# Patient Record
Sex: FEMALE | Race: WHITE | Employment: OTHER | URBAN - METROPOLITAN AREA
[De-identification: names, ages, dates, MRNs, and addresses within clinical notes are randomized per-mention and may not be internally consistent; named-entity substitution may affect disease eponyms.]

---

## 2017-04-26 ENCOUNTER — HOSPITAL ENCOUNTER (OUTPATIENT)
Age: 77
Setting detail: SPECIMEN
Discharge: HOME | End: 2017-04-26
Payer: MEDICARE

## 2017-05-01 LAB — SURGICAL PATHOLOGY REPORT: NORMAL

## 2022-05-16 RX ORDER — CYANOCOBALAMIN (VITAMIN B-12) 1000 MCG
2500 TABLET, EXTENDED RELEASE ORAL EVERY OTHER DAY
COMMUNITY

## 2022-05-16 RX ORDER — VIT C/B6/B5/MAGNESIUM/HERB 173 50-5-6-5MG
1000 CAPSULE ORAL DAILY
COMMUNITY

## 2022-05-16 RX ORDER — UBIDECARENONE 50 MG
2400 CAPSULE ORAL DAILY
COMMUNITY

## 2022-05-16 RX ORDER — HYDROXYCHLOROQUINE SULFATE 200 MG/1
200 TABLET, FILM COATED ORAL NIGHTLY
COMMUNITY

## 2022-05-16 RX ORDER — ACETAMINOPHEN 160 MG
2000 TABLET,DISINTEGRATING ORAL DAILY
COMMUNITY

## 2022-05-16 RX ORDER — M-VIT,TX,IRON,MINS/CALC/FOLIC 27MG-0.4MG
1 TABLET ORAL DAILY
COMMUNITY

## 2022-05-16 RX ORDER — CALCIUM CARBONATE 500(1250)
1000 TABLET ORAL DAILY
COMMUNITY

## 2022-05-16 RX ORDER — DIMENHYDRINATE 50 MG
100 TABLET ORAL DAILY
COMMUNITY

## 2022-05-18 ENCOUNTER — HOSPITAL ENCOUNTER (OUTPATIENT)
Age: 82
Setting detail: OUTPATIENT SURGERY
Discharge: HOME | End: 2022-05-18
Attending: OPHTHALMOLOGY | Admitting: OPHTHALMOLOGY
Payer: MEDICARE

## 2022-05-18 ENCOUNTER — ANESTHESIA (OUTPATIENT)
Dept: OPERATING ROOM | Age: 82
End: 2022-05-18
Payer: MEDICARE

## 2022-05-18 ENCOUNTER — ANESTHESIA EVENT (OUTPATIENT)
Dept: OPERATING ROOM | Age: 82
End: 2022-05-18
Payer: MEDICARE

## 2022-05-18 VITALS
HEIGHT: 63 IN | WEIGHT: 148 LBS | RESPIRATION RATE: 18 BRPM | DIASTOLIC BLOOD PRESSURE: 78 MMHG | SYSTOLIC BLOOD PRESSURE: 111 MMHG | OXYGEN SATURATION: 94 % | BODY MASS INDEX: 26.22 KG/M2 | TEMPERATURE: 97.9 F | HEART RATE: 60 BPM

## 2022-05-18 PROCEDURE — 3700000000 HC ANESTHESIA ATTENDED CARE: Performed by: OPHTHALMOLOGY

## 2022-05-18 PROCEDURE — 7100000040 HC SPAR PHASE II RECOVERY - FIRST 15 MIN: Performed by: OPHTHALMOLOGY

## 2022-05-18 PROCEDURE — 3600000014 HC SURGERY LEVEL 4 ADDTL 15MIN: Performed by: OPHTHALMOLOGY

## 2022-05-18 PROCEDURE — 2580000003 HC RX 258: Performed by: OPHTHALMOLOGY

## 2022-05-18 PROCEDURE — 2709999900 HC NON-CHARGEABLE SUPPLY: Performed by: OPHTHALMOLOGY

## 2022-05-18 PROCEDURE — A4217 STERILE WATER/SALINE, 500 ML: HCPCS | Performed by: OPHTHALMOLOGY

## 2022-05-18 PROCEDURE — 2580000003 HC RX 258: Performed by: NURSE ANESTHETIST, CERTIFIED REGISTERED

## 2022-05-18 PROCEDURE — 2500000003 HC RX 250 WO HCPCS: Performed by: OPHTHALMOLOGY

## 2022-05-18 PROCEDURE — 3700000001 HC ADD 15 MINUTES (ANESTHESIA): Performed by: OPHTHALMOLOGY

## 2022-05-18 PROCEDURE — 2720000010 HC SURG SUPPLY STERILE: Performed by: OPHTHALMOLOGY

## 2022-05-18 PROCEDURE — 2500000003 HC RX 250 WO HCPCS: Performed by: NURSE ANESTHETIST, CERTIFIED REGISTERED

## 2022-05-18 PROCEDURE — 6370000000 HC RX 637 (ALT 250 FOR IP): Performed by: OPHTHALMOLOGY

## 2022-05-18 PROCEDURE — 6360000002 HC RX W HCPCS: Performed by: NURSE ANESTHETIST, CERTIFIED REGISTERED

## 2022-05-18 PROCEDURE — 6360000002 HC RX W HCPCS: Performed by: OPHTHALMOLOGY

## 2022-05-18 PROCEDURE — 3600000004 HC SURGERY LEVEL 4 BASE: Performed by: OPHTHALMOLOGY

## 2022-05-18 PROCEDURE — 7100000041 HC SPAR PHASE II RECOVERY - ADDTL 15 MIN: Performed by: OPHTHALMOLOGY

## 2022-05-18 RX ORDER — TROPICAMIDE 10 MG/ML
1 SOLUTION/ DROPS OPHTHALMIC
Status: COMPLETED | OUTPATIENT
Start: 2022-05-18 | End: 2022-05-18

## 2022-05-18 RX ORDER — NEOMYCIN SULFATE, POLYMYXIN B SULFATE, AND DEXAMETHASONE 3.5; 10000; 1 MG/G; [USP'U]/G; MG/G
OINTMENT OPHTHALMIC PRN
Status: DISCONTINUED | OUTPATIENT
Start: 2022-05-18 | End: 2022-05-18 | Stop reason: ALTCHOICE

## 2022-05-18 RX ORDER — ONDANSETRON 2 MG/ML
4 INJECTION INTRAMUSCULAR; INTRAVENOUS
Status: DISCONTINUED | OUTPATIENT
Start: 2022-05-18 | End: 2022-05-18 | Stop reason: HOSPADM

## 2022-05-18 RX ORDER — LIDOCAINE HYDROCHLORIDE 10 MG/ML
INJECTION, SOLUTION EPIDURAL; INFILTRATION; INTRACAUDAL; PERINEURAL PRN
Status: DISCONTINUED | OUTPATIENT
Start: 2022-05-18 | End: 2022-05-18 | Stop reason: SDUPTHER

## 2022-05-18 RX ORDER — OFLOXACIN 3 MG/ML
1 SOLUTION/ DROPS OPHTHALMIC
Status: COMPLETED | OUTPATIENT
Start: 2022-05-18 | End: 2022-05-18

## 2022-05-18 RX ORDER — CYCLOPENTOLATE HYDROCHLORIDE 10 MG/ML
SOLUTION/ DROPS OPHTHALMIC PRN
Status: DISCONTINUED | OUTPATIENT
Start: 2022-05-18 | End: 2022-05-18 | Stop reason: ALTCHOICE

## 2022-05-18 RX ORDER — FENTANYL CITRATE 50 UG/ML
25 INJECTION, SOLUTION INTRAMUSCULAR; INTRAVENOUS EVERY 5 MIN PRN
Status: DISCONTINUED | OUTPATIENT
Start: 2022-05-18 | End: 2022-05-18 | Stop reason: HOSPADM

## 2022-05-18 RX ORDER — SODIUM CHLORIDE 0.9 % (FLUSH) 0.9 %
5-40 SYRINGE (ML) INJECTION PRN
Status: DISCONTINUED | OUTPATIENT
Start: 2022-05-18 | End: 2022-05-18 | Stop reason: HOSPADM

## 2022-05-18 RX ORDER — CYCLOPENTOLATE HYDROCHLORIDE 10 MG/ML
1 SOLUTION/ DROPS OPHTHALMIC
Status: COMPLETED | OUTPATIENT
Start: 2022-05-18 | End: 2022-05-18

## 2022-05-18 RX ORDER — SODIUM CHLORIDE 0.9 % (FLUSH) 0.9 %
5-40 SYRINGE (ML) INJECTION EVERY 12 HOURS SCHEDULED
Status: DISCONTINUED | OUTPATIENT
Start: 2022-05-18 | End: 2022-05-18 | Stop reason: HOSPADM

## 2022-05-18 RX ORDER — SODIUM CHLORIDE, SODIUM LACTATE, POTASSIUM CHLORIDE, CALCIUM CHLORIDE 600; 310; 30; 20 MG/100ML; MG/100ML; MG/100ML; MG/100ML
INJECTION, SOLUTION INTRAVENOUS CONTINUOUS PRN
Status: DISCONTINUED | OUTPATIENT
Start: 2022-05-18 | End: 2022-05-18 | Stop reason: SDUPTHER

## 2022-05-18 RX ORDER — DIPHENHYDRAMINE HYDROCHLORIDE 50 MG/ML
12.5 INJECTION INTRAMUSCULAR; INTRAVENOUS
Status: DISCONTINUED | OUTPATIENT
Start: 2022-05-18 | End: 2022-05-18 | Stop reason: HOSPADM

## 2022-05-18 RX ORDER — INDOCYANINE GREEN AND WATER 25 MG
KIT INJECTION PRN
Status: DISCONTINUED | OUTPATIENT
Start: 2022-05-18 | End: 2022-05-18 | Stop reason: ALTCHOICE

## 2022-05-18 RX ORDER — FENTANYL CITRATE 50 UG/ML
INJECTION, SOLUTION INTRAMUSCULAR; INTRAVENOUS PRN
Status: DISCONTINUED | OUTPATIENT
Start: 2022-05-18 | End: 2022-05-18 | Stop reason: SDUPTHER

## 2022-05-18 RX ORDER — SODIUM CHLORIDE 9 MG/ML
INJECTION, SOLUTION INTRAVENOUS PRN
Status: DISCONTINUED | OUTPATIENT
Start: 2022-05-18 | End: 2022-05-18 | Stop reason: HOSPADM

## 2022-05-18 RX ORDER — PROPOFOL 10 MG/ML
INJECTION, EMULSION INTRAVENOUS PRN
Status: DISCONTINUED | OUTPATIENT
Start: 2022-05-18 | End: 2022-05-18 | Stop reason: SDUPTHER

## 2022-05-18 RX ORDER — DEXTROSE MONOHYDRATE 50 MG/ML
INJECTION, SOLUTION INTRAVENOUS CONTINUOUS PRN
Status: COMPLETED | OUTPATIENT
Start: 2022-05-18 | End: 2022-05-18

## 2022-05-18 RX ORDER — BALANCED SALT SOLUTION ENRICHED WITH BICARBONATE, DEXTROSE, AND GLUTATHIONE
KIT INTRAOCULAR PRN
Status: DISCONTINUED | OUTPATIENT
Start: 2022-05-18 | End: 2022-05-18 | Stop reason: ALTCHOICE

## 2022-05-18 RX ORDER — PHENYLEPHRINE HYDROCHLORIDE 100 MG/ML
1 SOLUTION/ DROPS OPHTHALMIC
Status: COMPLETED | OUTPATIENT
Start: 2022-05-18 | End: 2022-05-18

## 2022-05-18 RX ADMIN — TROPICAMIDE 1 DROP: 10 SOLUTION/ DROPS OPHTHALMIC at 06:54

## 2022-05-18 RX ADMIN — TROPICAMIDE 1 DROP: 10 SOLUTION/ DROPS OPHTHALMIC at 07:11

## 2022-05-18 RX ADMIN — PHENYLEPHRINE HYDROCHLORIDE 1 DROP: 100 SOLUTION/ DROPS OPHTHALMIC at 07:11

## 2022-05-18 RX ADMIN — PHENYLEPHRINE HYDROCHLORIDE 1 DROP: 100 SOLUTION/ DROPS OPHTHALMIC at 06:41

## 2022-05-18 RX ADMIN — LIDOCAINE HYDROCHLORIDE 50 MG: 10 INJECTION, SOLUTION EPIDURAL; INFILTRATION; INTRACAUDAL; PERINEURAL at 08:23

## 2022-05-18 RX ADMIN — TROPICAMIDE 1 DROP: 10 SOLUTION/ DROPS OPHTHALMIC at 07:26

## 2022-05-18 RX ADMIN — CYCLOPENTOLATE HYDROCHLORIDE 1 DROP: 10 SOLUTION/ DROPS OPHTHALMIC at 07:11

## 2022-05-18 RX ADMIN — TROPICAMIDE 1 DROP: 10 SOLUTION/ DROPS OPHTHALMIC at 06:41

## 2022-05-18 RX ADMIN — CYCLOPENTOLATE HYDROCHLORIDE 1 DROP: 10 SOLUTION/ DROPS OPHTHALMIC at 06:54

## 2022-05-18 RX ADMIN — OFLOXACIN 1 DROP: 3 SOLUTION OPHTHALMIC at 06:54

## 2022-05-18 RX ADMIN — PHENYLEPHRINE HYDROCHLORIDE 1 DROP: 100 SOLUTION/ DROPS OPHTHALMIC at 06:54

## 2022-05-18 RX ADMIN — CYCLOPENTOLATE HYDROCHLORIDE 1 DROP: 10 SOLUTION/ DROPS OPHTHALMIC at 06:41

## 2022-05-18 RX ADMIN — OFLOXACIN 1 DROP: 3 SOLUTION OPHTHALMIC at 07:26

## 2022-05-18 RX ADMIN — OFLOXACIN 1 DROP: 3 SOLUTION OPHTHALMIC at 06:41

## 2022-05-18 RX ADMIN — OFLOXACIN 1 DROP: 3 SOLUTION OPHTHALMIC at 07:11

## 2022-05-18 RX ADMIN — PROPOFOL 60 MG: 10 INJECTION, EMULSION INTRAVENOUS at 08:23

## 2022-05-18 RX ADMIN — CYCLOPENTOLATE HYDROCHLORIDE 1 DROP: 10 SOLUTION/ DROPS OPHTHALMIC at 07:26

## 2022-05-18 RX ADMIN — FENTANYL CITRATE 25 MCG: 50 INJECTION, SOLUTION INTRAMUSCULAR; INTRAVENOUS at 09:11

## 2022-05-18 RX ADMIN — SODIUM CHLORIDE, POTASSIUM CHLORIDE, SODIUM LACTATE AND CALCIUM CHLORIDE: 600; 310; 30; 20 INJECTION, SOLUTION INTRAVENOUS at 08:18

## 2022-05-18 ASSESSMENT — LIFESTYLE VARIABLES: SMOKING_STATUS: 0

## 2022-05-18 ASSESSMENT — PAIN - FUNCTIONAL ASSESSMENT: PAIN_FUNCTIONAL_ASSESSMENT: NONE - DENIES PAIN

## 2022-05-18 NOTE — BRIEF OP NOTE
Brief Postoperative Note      Patient: Esteban Mullins  YOB: 1940  MRN: [id number]    Date of Procedure: 5/18/2022    Pre-Op Diagnosis: MACULAR PUCKER    Post-Op Diagnosis: Same       Procedure(s):  PARS PLANA VITRECTOMY 25 GAUGE, MEMBRANE PEEL, AIR FLUID EXCHANGE, ICG    Surgeon(s):  Cary Lomas MD    Assistant:  * No surgical staff found *    Anesthesia: Monitor Anesthesia Care    Estimated Blood Loss (mL): Minimal    Complications: None    Specimens:   * No specimens in log *    Implants:  * No implants in log *      Drains: * No LDAs found *    Findings:     Electronically signed by Cary Lomas MD on 5/18/2022 at 9:31 AM

## 2022-05-18 NOTE — ANESTHESIA PRE PROCEDURE
Department of Anesthesiology  Preprocedure Note       Name:  Mejia Monsivais   Age:  80 y.o.  :  1940                                          MRN:  [id number]         Date:  2022      Surgeon: Lisa Zhao):  Jordon Sauceda MD    Procedure: Procedure(s):  PARS PLANA VITRECTOMY 22 GAUGE, [address] [facility]    Department of Anesthesiology  Pre-Anesthesia Evaluation/Consultation         Name:  Mejia Monsivais                                         Age:  80 y.o. MRN:  [id number]             Medications  Current Facility-Administered Medications   Medication Dose Route Frequency Provider Last Rate Last Admin    ofloxacin (OCUFLOX) 0.3 % solution 1 drop  1 drop Right Eye Q15 Min PRN Jordon Sauceda MD   1 drop at 22 0654    tropicamide (MYDRIACYL) 1 % ophthalmic solution 1 drop  1 drop Right Eye Q15 Min PRN Jordon Sauceda MD   1 drop at 22 0654    phenylephrine (HARI-SYNEPHRINE) 10 % ophthalmic solution 1 drop  1 drop Right Eye Q15 Min PRN Jordon Sauceda MD   1 drop at 22 0654    cyclopentolate (CYCLOGYL) 1 % ophthalmic solution 1 drop  1 drop Right Eye Q15 Min PRN Jordon Sauceda MD   1 drop at 22 1685       Allergies   Allergen Reactions    Percocet [Oxycodone-Acetaminophen] Hives    Sulfa Antibiotics Other (See Comments)     Unknown reaction as a child     There is no problem list on file for this patient.     Past Medical History:   Diagnosis Date    Arthritis     rheumatoid, osteoarthritis, PMR    Asthma     no longer uses inhalers    History of cardiac murmur     pt reports initially heard in her 44's but that it hasn't been heard recently    History of COVID-19 2019    not hospitalized    Hyperlipidemia     Macular pucker, right     Under care of team     [facility]/ last seen     Wears glasses     Wellness examination     PCP Hans Seymour [facility]/ [place]/ last seen -     Past Surgical History: Procedure Laterality Date    BLADDER SURGERY      Lift X 2    CATARACT REMOVAL WITH IMPLANT Bilateral     COLONOSCOPY      multiple    HYSTERECTOMY      TONSILLECTOMY       Social History     Tobacco Use    Smoking status: Never Smoker    Smokeless tobacco: Never Used   Vaping Use    Vaping Use: Never used   Substance Use Topics    Alcohol use: Never    Drug use: Never         Vital Signs (Current)   Vitals:    22   BP: 124/74   Pulse: 61   Resp: 16   Temp: 97.7 °F (36.5 °C)   SpO2: 94%     Vital Signs Statistics (for past 48 hrs)     Temp  Av.7 °F (36.5 °C)  Min: 97.7 °F (36.5 °C)   Min taken time: 22  Max: 97.7 °F (36.5 °C)   Max taken time: 22  Pulse  Av  Min: 64   Min taken time: 22  Max: 64   Max taken time: 22  Resp  Av  Min: 12   Min taken time: 22  Max: 12   Max taken time: 22  BP  Min: 124/74   Min taken time: 22  Max: 124/74   Max taken time: 22  SpO2  Av %  Min: 94 %   Min taken time: 22  Max: 94 %   Max taken time: 22  BP Readings from Last 3 Encounters:   22 124/74       BMI  Body mass index is 26.22 kg/m². CBC No results found for: WBC, RBC, HGB, HCT, MCV, RDW, PLT    CMP  No results found for: NA, K, CL, CO2, BUN, CREATININE, GFRAA, AGRATIO, LABGLOM, GLUCOSE, GLU, PROT, CALCIUM, BILITOT, ALKPHOS, AST, ALT    BMP  No results found for: NA, K, CL, CO2, BUN, CREATININE, CALCIUM, GFRAA, LABGLOM, GLUCOSE, GLU    POC Testing  No results for input(s): POCGLU, POCNA, POCK, POCCL, POCBUN, POCHEMO, POCHCT in the last 72 hours.     Coags  No results found for: PROTIME, INR, APTT    HCG (If Applicable) No results found for: PREGTESTUR, PREGSERUM, HCG, HCGQUANT     ABGs No results found for: PHART, PO2ART, MTC4POS, UJZ0AOO, BEART, J3MNAXGZ     Type & Screen (If Applicable)  No results found for: [facility]    Radiology (If Applicable)    Cardiac Testing (If Applicable)     EKG (If Applicable) SB          Medications prior to admission:   Prior to Admission medications    Medication Sig Start Date End Date Taking?  Authorizing Provider   hydroxychloroquine (PLAQUENIL) 200 MG tablet Take 200 mg by mouth nightly   Yes Historical Provider, MD   Cyanocobalamin (VITAMIN B-12) 1000 MCG extended release tablet Take 2,500 mcg by mouth every other day   Yes Historical Provider, MD   Apoaequorin (PREVAGEN PO) Take 1 tablet by mouth daily   Yes Historical Provider, MD   GARLIC PO Take 857 mg by mouth daily   Yes Historical Provider, MD   Cholecalciferol (VITAMIN D3) 50 MCG (2000 UT) CAPS Take 2,000 Units by mouth daily   Yes Historical Provider, MD   calcium carbonate (OSCAL) 500 MG TABS tablet Take 1,000 mg by mouth daily   Yes Historical Provider, MD   Red Yeast Rice 600 MG TABS Take 2,400 mg by mouth daily   Yes Historical Provider, MD   Multiple Vitamins-Minerals (PRESERVISION AREDS 2 PO) Take 1 tablet by mouth daily   Yes Historical Provider, MD   Biotin w/ Vitamins C & E (HAIR SKIN & NAILS GUMMIES PO) Take 6,000 mcg by mouth daily   Yes Historical Provider, MD   Multiple Vitamins-Minerals (THERAPEUTIC MULTIVITAMIN-MINERALS) tablet Take 1 tablet by mouth daily   Yes Historical Provider, MD   Quercetin 250 MG TABS Take 500 mg by mouth daily   Yes Historical Provider, MD   turmeric 500 MG CAPS Take 1,000 mg by mouth daily   Yes Historical Provider, MD   Coenzyme Q10 (CO Q-10) 100 MG CAPS Take 100 mg by mouth daily   Yes Historical Provider, MD       Current medications:    Current Facility-Administered Medications   Medication Dose Route Frequency Provider Last Rate Last Admin    ofloxacin (OCUFLOX) 0.3 % solution 1 drop  1 drop Right Eye Q15 Min PRN Brody Baxter MD   1 drop at 05/18/22 0641    tropicamide (MYDRIACYL) 1 % ophthalmic solution 1 drop  1 drop Right Eye Q15 Min PRN Brody Baxter MD   1 drop at 05/18/22 0641    phenylephrine (HARI-SYNEPHRINE) 10 % ophthalmic solution 1 drop  1 drop Right Eye Q15 Min PRN Brody Baxter MD   1 drop at 05/18/22 0641    cyclopentolate (CYCLOGYL) 1 % ophthalmic solution 1 drop  1 drop Right Eye Q15 Min PRN Brody Baxter MD   1 drop at 05/18/22 7139       Allergies: Allergies   Allergen Reactions    Percocet [Oxycodone-Acetaminophen] Hives    Sulfa Antibiotics Other (See Comments)     Unknown reaction as a child       Problem List:  There is no problem list on file for this patient.       Past Medical History:        Diagnosis Date    Arthritis     rheumatoid, osteoarthritis, PMI arthritis    Asthma     no longer uses inhalers    Hyperlipidemia     Macular pucker, right     Under care of team     Rheumatology Dr. Holley Berrios [facility]/ last seen 5-2022    Wears glasses     Wellness examination     PCP Levi Abrams [facility]/ [place]/ last seen 5-2022       Past Surgical History:        Procedure Laterality Date    BLADDER SURGERY      Lift X 2    COLONOSCOPY      multiple    EYE SURGERY Bilateral     Cataracts w/ IOL    HYSTERECTOMY      TONSILLECTOMY         Social History:    Social History     Tobacco Use    Smoking status: Never Smoker    Smokeless tobacco: Never Used   Substance Use Topics    Alcohol use: Never                                Counseling given: Not Answered      Vital Signs (Current):   Vitals:    05/16/22 1440 05/18/22 0633   BP:  124/74   Pulse:  61   Resp:  16   Temp:  97.7 °F (36.5 °C)   TempSrc:  Temporal   SpO2:  94%   Weight: 150 lb (68 kg) 148 lb (67.1 kg)   Height: 5' 3\" (1.6 m) 5' 3\" (1.6 m)                                              BP Readings from Last 3 Encounters:   05/18/22 124/74       NPO Status: Time of last liquid consumption: 2000                        Time of last solid consumption: 2000                        Date of last liquid consumption: 05/17/22                        Date of last solid food consumption: 05/17/22    BMI:   Wt Readings from Last 3 Encounters:   05/18/22 148 lb (67.1 kg)     Body mass index is 26.22 kg/m². CBC: No results found for: WBC, RBC, HGB, HCT, MCV, RDW, PLT    CMP: No results found for: NA, K, CL, CO2, BUN, CREATININE, GFRAA, AGRATIO, LABGLOM, GLUCOSE, GLU, PROT, CALCIUM, BILITOT, ALKPHOS, AST, ALT    POC Tests: No results for input(s): POCGLU, POCNA, POCK, POCCL, POCBUN, POCHEMO, POCHCT in the last 72 hours. Coags: No results found for: PROTIME, INR, APTT    HCG (If Applicable): No results found for: PREGTESTUR, PREGSERUM, HCG, HCGQUANT     ABGs: No results found for: PHART, PO2ART, RMS3IFJ, XJE2OWG, BEART, N7TEMNVF     Type & Screen (If Applicable):  No results found for: LABABO, LABRH    Drug/Infectious Status (If Applicable):  No results found for: HIV, HEPCAB    COVID-19 Screening (If Applicable): No results found for: COVID19        Anesthesia Evaluation   no history of anesthetic complications:   Airway: Mallampati: II     Neck ROM: full   Dental:          Pulmonary:       (-) asthma, recent URI and not a current smoker                           Cardiovascular:  Exercise tolerance: good (>4 METS),                     Neuro/Psych:      (-) seizures and CVA           GI/Hepatic/Renal:        (-) GERD       Endo/Other:    (+) : arthritis: rheumatoid. , .    (-) diabetes mellitus               Abdominal:             Vascular: Other Findings:             Anesthesia Plan      MAC     ASA 2       Induction: intravenous.                           Tish Wright MD   5/18/2022

## 2022-05-18 NOTE — ANESTHESIA POSTPROCEDURE EVALUATION
Department of Anesthesiology  Postprocedure Note    Patient: Loan Jamison  MRN: [id number]  YOB: 1940  Date of evaluation: 5/18/2022  Time:  1:53 PM     Procedure Summary     Date: 05/18/22 Room / Location: [facility] OR 05 / [address] [facility] [place]    Anesthesia Start: 0818 Anesthesia Stop: 4572    Procedure: PARS PLANA VITRECTOMY 25 GAUGE, MEMBRANE PEEL, AIR FLUID EXCHANGE, ICG (Right ) Diagnosis: (MACULAR PUCKER)    Surgeons: Dago Agarwal MD Responsible Provider: Margarette Whitfield MD    Anesthesia Type: MAC ASA Status: 2          Anesthesia Type: No value filed. Stephen Phase I:      Stephen Phase II: Stephen Score: 10    Last vitals: Reviewed and per EMR flowsheets.        Anesthesia Post Evaluation    Patient location during evaluation: PACU  Patient participation: complete - patient participated  Level of consciousness: awake and alert  Pain score: 0  Nausea & Vomiting: no nausea  Cardiovascular status: hemodynamically stable  Respiratory status: room air

## 2022-05-18 NOTE — H&P
History and Physical    Pt Name: Jimenez Jenkins  MRN: [id number]  YOB: 1940  Date of evaluation: 5/18/2022  Primary Care Physician: DANIEL Mcintosh - CNP    SUBJECTIVE:   History of Chief Complaint:    Jimenez Jenkins is a 80 y.o. female who is scheduled today for PARS PLANA VITRECTOMY 25 GAUGE, MEMBRANE PEEL, ICG - Right. She reports a one month history of distorted vision on the right due to a \"macular wrinkle\". She reports reading a lot and doing small work and this is affecting these things. She has has prior cataract removal.   Allergies  is allergic to percocet [oxycodone-acetaminophen] and sulfa antibiotics. Medications  Prior to Admission medications    Medication Sig Start Date End Date Taking?  Authorizing Provider   hydroxychloroquine (PLAQUENIL) 200 MG tablet Take 200 mg by mouth nightly   Yes Historical Provider, MD   Cyanocobalamin (VITAMIN B-12) 1000 MCG extended release tablet Take 2,500 mcg by mouth every other day   Yes Historical Provider, MD   Apoaequorin (PREVAGEN PO) Take 1 tablet by mouth daily   Yes Historical Provider, MD   GARLIC PO Take 814 mg by mouth daily   Yes Historical Provider, MD   Cholecalciferol (VITAMIN D3) 50 MCG (2000 UT) CAPS Take 2,000 Units by mouth daily   Yes Historical Provider, MD   calcium carbonate (OSCAL) 500 MG TABS tablet Take 1,000 mg by mouth daily   Yes Historical Provider, MD   Red Yeast Rice 600 MG TABS Take 2,400 mg by mouth daily   Yes Historical Provider, MD   Multiple Vitamins-Minerals (PRESERVISION AREDS 2 PO) Take 1 tablet by mouth daily   Yes Historical Provider, MD   Biotin w/ Vitamins C & E (HAIR SKIN & NAILS GUMMIES PO) Take 6,000 mcg by mouth daily   Yes Historical Provider, MD   Multiple Vitamins-Minerals (THERAPEUTIC MULTIVITAMIN-MINERALS) tablet Take 1 tablet by mouth daily   Yes Historical Provider, MD   Quercetin 250 MG TABS Take 500 mg by mouth daily   Yes Historical Provider, MD   turmeric 500 MG CAPS Take 1,000 mg by mouth daily Yes Historical Provider, MD   Coenzyme Q10 (CO Q-10) 100 MG CAPS Take 100 mg by mouth daily   Yes Historical Provider, MD     Past Medical History    has a past medical history of Arthritis, Asthma, History of cardiac murmur, History of COVID-19, Hyperlipidemia, Macular pucker, right, Under care of team, Wears glasses, and Wellness examination. Past Surgical History   has a past surgical history that includes Tonsillectomy; Bladder surgery; Cataract removal with implant (Bilateral); Hysterectomy; and Colonoscopy. Social History   reports that she has never smoked. She has never used smokeless tobacco.   reports no history of alcohol use. reports no history of drug use. Marital Status    Children 3  Occupation was a [employer]  Family History  Family Status   Relation Name Status    Mother      Father       family history includes Cancer in her mother; Heart Disease in her mother; High Blood Pressure in her mother. Review of Systems:  CONSTITUTIONAL:   negative for fevers, chills, fatigue and malaise    EYES:   See HPI   HEENT:   negative for tinnitus, epistaxis and sore throat     RESPIRATORY:   negative for cough, shortness of breath, wheezing     CARDIOVASCULAR:   negative for chest pain, palpitations, syncope, edema     GASTROINTESTINAL:   negative for nausea, vomiting     GENITOURINARY:   negative for incontinence     MUSCULOSKELETAL:   negative for neck or back pain     NEUROLOGICAL:   Negative for weakness and tingling  negative for headaches and dizziness     PSYCHIATRIC:   negative for anxiety       OBJECTIVE:   VITALS:  height is 5' 3\" (1.6 m) and weight is 148 lb (67.1 kg). Her temporal temperature is 97.7 °F (36.5 °C). Her blood pressure is 124/74 and her pulse is 61. Her respiration is 16 and oxygen saturation is 94%. CONSTITUTIONAL:alert & oriented x 3, no acute distress. Calm and pleasant. Very friendly. Appears younger than stated age.   SKIN:  Warm and dry, no rashes on exposed areas of skin  HEAD:  Normocephalic, atraumatic   EYES: PERRL. EOMs intact. EARS:  Equal bilaterally, no edema or thickening, skin is intact without lumps or lesions. No discharge. Hearing grossly WNL. NOSE:  Nares patent. No rhinorrhea   MOUTH/THROAT:  Mucous membranes moist, tongue is pink, uvula midline, teeth appear to be intact  NECK:supple, full ROM  LUNGS: Respirations even and non-labored. Clear to auscultation bilaterally, no wheezes, rales, or rhonchi. CARDIOVASCULAR: Regular rate and rhythm, very soft murmur appreciated (history of, no echo on file, no cardiac complaints)  ABDOMEN: soft, non-tender, non-distended, bowel sounds active x 4   EXTREMITIES: No edema bilateral lower extremities. No varicosities bilateral lower extremities. NEUROLOGIC: CN II-XII are grossly intact. Gait not assessed.    IMPRESSIONS:   Macular pucker  PLANS:   PARS PLANA VITRECTOMY 25 GAUGE, MEMBRANE PEEL, ICG - Right    DANIEL ANTOINE - CNP   Electronically signed 5/18/2022 at 7:05 AM

## 2022-05-18 NOTE — BRIEF OP NOTE
Brief Postoperative Note      Patient: Jessica San  YOB: 1940  MRN: [id number]    Date of Procedure: 5/18/2022    Pre-Op Diagnosis: MACULAR PUCKER right     Post-Op Diagnosis: Same       Procedure(s):  PARS PLANA VITRECTOMY 25 GAUGE, MEMBRANE PEEL, AIR FLUID EXCHANGE, ICG    Surgeon(s):  Bhavani Campbell MD    Assistant:  * No surgical staff found *    Anesthesia: Monitor Anesthesia Care    Estimated Blood Loss (mL): Minimal    Complications: None    Specimens:   * No specimens in log *    Implants:  * No implants in log *      Drains: * No LDAs found *    Findings:     Electronically signed by Bhavani Campbell MD on 5/18/2022 at 9:31 AM

## 2022-05-19 NOTE — OP NOTE
[address]                  [facility]                                OPERATIVE REPORT    PATIENT NAME: Sofia Romero                   :        1940  MED REC NO:   [id number]                             ROOM:  ACCOUNT NO:   [de-identified]                           ADMIT DATE: 2022  PROVIDER:     Santa Angelucci. Bernstein    DATE OF PROCEDURE:  2022    PREOPERATIVE DIAGNOSIS:  Right macular pucker. POSTOPERATIVE DIAGNOSIS:  Right macular pucker. OPERATIONS PERFORMED:  Right pars plana vitrectomy, membrane stripping,  air-fluid exchange. SURGEON:  Santa Angelucci. Neo Skelton MD    COMPLICATIONS:  None. ANESTHETIC:  MAC. BLOOD LOSS:  0.    INDICATION FOR SURGERY:  The above patient presented to my office with  complaints of blurred vision, progressive decline of vision, and  difficulty reading, driving and functioning. She requests surgery to  improve her vision. 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 I explained to her that the  distortions likely get better with surgery over time, but her visual  acuity and improvement will be limited by the ongoing macular  degeneration, which could worsen over time. She understands the  prolonged healing process. I discussed the possibility of retinal  detachment, infection, bleeding, optic neuropathy, macular edema,  development of wet AMD, , and worsening of AMD.  The  patient understood these risks and was willing to proceed with surgery. OPERATIVE PROCEDURE:  After informed consent was obtained, the patient  underwent a right retrobulbar block. A total of 8 mL of 2% lidocaine  was used with 0.75% Marcaine in a 50:50 mixture without epinephrine. The patient's right eye was draped and prepped in the usual sterile  fashion.   In anticipation for a 25-gauge surgery, Betadine was placed in  the conjunctiva as prophylaxis against the infection. Measuring 3.5 mm  posterior to the limbus, valved trocars were placed inferotemporally,  superonasally, and superotemporally. Ocutome and light pipe were placed  in the vitreous cavity. The infusion cannula was then turned on and  made sure it was seen in the vitreous cavity prior to turning the  infusion cannula on. It was turned on to 35. Using ocutome and light  pipe, a core vitrectomy was carried out. There was no PVD. The  posterior hyaloid was removed in a posterior fashion for 360 degrees. In order to enhance ability of the tight epiretinal membrane, several  drops of 0.1 ICG in D5W mixture, without any BSS, were placed over the  posterior pole. It was allowed to stain for approximately 5 seconds and  vacuumed off with a silicone-tipped extrusion needle. Using a ScaleArc  membrane scraper, a scratch down was made two disk diameters temporal to  the macula and a rent was created in the epiretinal membrane. We then  removed complete sheath with ILM forceps. The entire maneuver took  approximately 3 minutes. Light was at 32, far away from the macula. At  this time, peripheral examination and scleral depression did not show  any retinal hole, tear, detachment, or dialysis. At this time,  air-fluid exchange was carried out partially to decrease the risk of  infection and decrease the risk of hypotony. All the trocars were  removed, the superior trocars and infusion cannula, which were air  tight. Subconjunctival vancomycin was given superiorly and inferiorly. Antibiotic ointment was placed, and the eye patched and shielded. The  patient returned to Recovery in satisfactory condition.         Sj Liz    D: 05/18/2022 23:26:51       T: 05/19/2022 9:33:43     [email or web address]  Job#: [id number]     Doc#: [id number]    CC:

## 2022-11-16 ENCOUNTER — HOSPITAL ENCOUNTER (EMERGENCY)
Age: 82
Discharge: HOME | End: 2022-11-16
Attending: EMERGENCY MEDICINE
Payer: MEDICARE

## 2022-11-16 ENCOUNTER — APPOINTMENT (OUTPATIENT)
Dept: CT IMAGING | Age: 82
End: 2022-11-16
Payer: MEDICARE

## 2022-11-16 VITALS
HEART RATE: 75 BPM | SYSTOLIC BLOOD PRESSURE: 116 MMHG | BODY MASS INDEX: 25.16 KG/M2 | WEIGHT: 142 LBS | HEIGHT: 63 IN | RESPIRATION RATE: 16 BRPM | DIASTOLIC BLOOD PRESSURE: 72 MMHG | OXYGEN SATURATION: 97 % | TEMPERATURE: 98.1 F

## 2022-11-16 DIAGNOSIS — R10.9 ABDOMINAL PAIN, UNSPECIFIED ABDOMINAL LOCATION: Primary | ICD-10-CM

## 2022-11-16 LAB
ABSOLUTE EOS #: 0.1 K/UL (ref 0–0.4)
ABSOLUTE LYMPH #: 1.5 K/UL (ref 1–4.8)
ABSOLUTE MONO #: 0.4 K/UL (ref 0.1–1.2)
ALBUMIN SERPL-MCNC: 5 G/DL (ref 3.5–5.2)
ALBUMIN/GLOBULIN RATIO: 1.8 (ref 1–2.5)
ALP BLD-CCNC: 88 U/L (ref 35–104)
ALT SERPL-CCNC: 10 U/L (ref 5–33)
ANION GAP SERPL CALCULATED.3IONS-SCNC: 13 MMOL/L (ref 9–17)
AST SERPL-CCNC: 19 U/L
BACTERIA: ABNORMAL
BASOPHILS # BLD: 0 % (ref 0–2)
BASOPHILS ABSOLUTE: 0 K/UL (ref 0–0.2)
BILIRUB SERPL-MCNC: 0.6 MG/DL (ref 0.3–1.2)
BILIRUBIN DIRECT: 0.1 MG/DL
BILIRUBIN URINE: NEGATIVE
BILIRUBIN, INDIRECT: 0.5 MG/DL (ref 0–1)
BUN BLDV-MCNC: 15 MG/DL (ref 8–23)
CALCIUM SERPL-MCNC: 11.2 MG/DL (ref 8.6–10.4)
CHLORIDE BLD-SCNC: 95 MMOL/L (ref 98–107)
CO2: 25 MMOL/L (ref 20–31)
COLOR: YELLOW
CREAT SERPL-MCNC: 0.73 MG/DL (ref 0.5–0.9)
EOSINOPHILS RELATIVE PERCENT: 1 % (ref 1–4)
EPITHELIAL CELLS UA: ABNORMAL /HPF (ref 0–5)
GFR SERPL CREATININE-BSD FRML MDRD: >60 ML/MIN/1.73M2
GLUCOSE BLD-MCNC: 160 MG/DL (ref 70–99)
GLUCOSE URINE: NEGATIVE
HCT VFR BLD CALC: 40.5 % (ref 36–46)
HEMOGLOBIN: 13.4 G/DL (ref 12–16)
KETONES, URINE: ABNORMAL
LACTIC ACID: 2.2 MMOL/L (ref 0.5–2.2)
LEUKOCYTE ESTERASE, URINE: ABNORMAL
LIPASE: 30 U/L (ref 13–60)
LYMPHOCYTES # BLD: 17 % (ref 24–44)
MCH RBC QN AUTO: 29.6 PG (ref 26–34)
MCHC RBC AUTO-ENTMCNC: 33 G/DL (ref 31–37)
MCV RBC AUTO: 89.6 FL (ref 80–100)
MONOCYTES # BLD: 4 % (ref 2–11)
NITRITE, URINE: NEGATIVE
OTHER OBSERVATIONS UA: ABNORMAL
PDW BLD-RTO: 13.8 % (ref 12.5–15.4)
PH UA: 7 (ref 5–8)
PLATELET # BLD: 394 K/UL (ref 140–450)
PMV BLD AUTO: 8.6 FL (ref 6–12)
POTASSIUM SERPL-SCNC: 4.3 MMOL/L (ref 3.7–5.3)
PROTEIN UA: NEGATIVE
RBC # BLD: 4.51 M/UL (ref 4–5.2)
RBC UA: ABNORMAL /HPF (ref 0–2)
SEG NEUTROPHILS: 78 % (ref 36–66)
SEGMENTED NEUTROPHILS ABSOLUTE COUNT: 6.8 K/UL (ref 1.8–7.7)
SODIUM BLD-SCNC: 133 MMOL/L (ref 135–144)
SPECIFIC GRAVITY UA: 1.01 (ref 1–1.03)
TOTAL PROTEIN: 7.8 G/DL (ref 6.4–8.3)
TURBIDITY: CLEAR
URINE HGB: NEGATIVE
UROBILINOGEN, URINE: NORMAL
WBC # BLD: 8.9 K/UL (ref 3.5–11)
WBC UA: ABNORMAL /HPF (ref 0–5)

## 2022-11-16 PROCEDURE — 6360000002 HC RX W HCPCS: Performed by: EMERGENCY MEDICINE

## 2022-11-16 PROCEDURE — 6360000004 HC RX CONTRAST MEDICATION: Performed by: EMERGENCY MEDICINE

## 2022-11-16 PROCEDURE — 80048 BASIC METABOLIC PNL TOTAL CA: CPT

## 2022-11-16 PROCEDURE — 83690 ASSAY OF LIPASE: CPT

## 2022-11-16 PROCEDURE — 81001 URINALYSIS AUTO W/SCOPE: CPT

## 2022-11-16 PROCEDURE — 96374 THER/PROPH/DIAG INJ IV PUSH: CPT

## 2022-11-16 PROCEDURE — 2580000003 HC RX 258: Performed by: EMERGENCY MEDICINE

## 2022-11-16 PROCEDURE — 80076 HEPATIC FUNCTION PANEL: CPT

## 2022-11-16 PROCEDURE — 99285 EMERGENCY DEPT VISIT HI MDM: CPT

## 2022-11-16 PROCEDURE — 74177 CT ABD & PELVIS W/CONTRAST: CPT

## 2022-11-16 PROCEDURE — 85025 COMPLETE CBC W/AUTO DIFF WBC: CPT

## 2022-11-16 PROCEDURE — 96375 TX/PRO/DX INJ NEW DRUG ADDON: CPT

## 2022-11-16 PROCEDURE — 83605 ASSAY OF LACTIC ACID: CPT

## 2022-11-16 RX ORDER — MORPHINE SULFATE 4 MG/ML
4 INJECTION, SOLUTION INTRAMUSCULAR; INTRAVENOUS ONCE
Status: COMPLETED | OUTPATIENT
Start: 2022-11-16 | End: 2022-11-16

## 2022-11-16 RX ORDER — 0.9 % SODIUM CHLORIDE 0.9 %
1000 INTRAVENOUS SOLUTION INTRAVENOUS ONCE
Status: COMPLETED | OUTPATIENT
Start: 2022-11-16 | End: 2022-11-16

## 2022-11-16 RX ORDER — KETOROLAC TROMETHAMINE 15 MG/ML
15 INJECTION, SOLUTION INTRAMUSCULAR; INTRAVENOUS ONCE
Status: COMPLETED | OUTPATIENT
Start: 2022-11-16 | End: 2022-11-16

## 2022-11-16 RX ORDER — SODIUM CHLORIDE 0.9 % (FLUSH) 0.9 %
10 SYRINGE (ML) INJECTION PRN
Status: DISCONTINUED | OUTPATIENT
Start: 2022-11-16 | End: 2022-11-16 | Stop reason: HOSPADM

## 2022-11-16 RX ORDER — DICYCLOMINE HYDROCHLORIDE 10 MG/1
10 CAPSULE ORAL EVERY 6 HOURS PRN
Qty: 20 CAPSULE | Refills: 0 | Status: SHIPPED | OUTPATIENT
Start: 2022-11-16

## 2022-11-16 RX ORDER — HYDROCODONE BITARTRATE AND ACETAMINOPHEN 5; 325 MG/1; MG/1
1 TABLET ORAL EVERY 6 HOURS PRN
Qty: 6 TABLET | Refills: 0 | Status: SHIPPED | OUTPATIENT
Start: 2022-11-16 | End: 2022-11-19

## 2022-11-16 RX ORDER — ONDANSETRON 2 MG/ML
4 INJECTION INTRAMUSCULAR; INTRAVENOUS ONCE
Status: COMPLETED | OUTPATIENT
Start: 2022-11-16 | End: 2022-11-16

## 2022-11-16 RX ORDER — ONDANSETRON 4 MG/1
4 TABLET, ORALLY DISINTEGRATING ORAL EVERY 8 HOURS PRN
Qty: 10 TABLET | Refills: 0 | Status: SHIPPED | OUTPATIENT
Start: 2022-11-16

## 2022-11-16 RX ORDER — 0.9 % SODIUM CHLORIDE 0.9 %
80 INTRAVENOUS SOLUTION INTRAVENOUS ONCE
Status: DISCONTINUED | OUTPATIENT
Start: 2022-11-16 | End: 2022-11-16 | Stop reason: HOSPADM

## 2022-11-16 RX ADMIN — Medication 80 ML: at 07:13

## 2022-11-16 RX ADMIN — MORPHINE SULFATE 4 MG: 4 INJECTION INTRAVENOUS at 06:31

## 2022-11-16 RX ADMIN — IOPAMIDOL 75 ML: 755 INJECTION, SOLUTION INTRAVENOUS at 07:13

## 2022-11-16 RX ADMIN — KETOROLAC TROMETHAMINE 15 MG: 15 INJECTION, SOLUTION INTRAMUSCULAR; INTRAVENOUS at 07:41

## 2022-11-16 RX ADMIN — SODIUM CHLORIDE 1000 ML: 9 INJECTION, SOLUTION INTRAVENOUS at 06:31

## 2022-11-16 RX ADMIN — MORPHINE SULFATE 4 MG: 4 INJECTION INTRAVENOUS at 06:58

## 2022-11-16 RX ADMIN — ONDANSETRON 4 MG: 2 INJECTION INTRAMUSCULAR; INTRAVENOUS at 06:31

## 2022-11-16 RX ADMIN — SODIUM CHLORIDE, PRESERVATIVE FREE 10 ML: 5 INJECTION INTRAVENOUS at 07:13

## 2022-11-16 ASSESSMENT — PAIN SCALES - GENERAL
PAINLEVEL_OUTOF10: 8
PAINLEVEL_OUTOF10: 6
PAINLEVEL_OUTOF10: 10
PAINLEVEL_OUTOF10: 10

## 2022-11-16 ASSESSMENT — PAIN DESCRIPTION - LOCATION
LOCATION: ABDOMEN;BACK
LOCATION: FLANK
LOCATION: ABDOMEN;BACK
LOCATION: FLANK
LOCATION: ABDOMEN;BACK
LOCATION: FLANK

## 2022-11-16 ASSESSMENT — PAIN DESCRIPTION - ORIENTATION
ORIENTATION: RIGHT

## 2022-11-16 ASSESSMENT — PAIN - FUNCTIONAL ASSESSMENT: PAIN_FUNCTIONAL_ASSESSMENT: 0-10

## 2022-11-16 NOTE — ED PROVIDER NOTES
[facility] Emergency Department  [address] [address] [facility] [address] [facility] [address]  Phone: [phone]  Fax: [phone]        ADDENDUM:      Care of this patient was assumed from Dr. Natasha Taylor. The patient was seen for Abdominal Pain and Back Pain  . The patient's initial evaluation and plan have been discussed with the prior provider who initially evaluated the patient. Nursing Notes, Past Medical Hx, Past Surgical Hx, Allergies, were all reviewed. PAST MEDICAL HISTORY    has a past medical history of Arthritis, Asthma, History of cardiac murmur, History of COVID-19, Hyperlipidemia, Macular pucker, right, Under care of team, Wears glasses, and Wellness examination. SURGICAL HISTORY      has a past surgical history that includes Tonsillectomy; Bladder surgery; Cataract removal with implant (Bilateral); Hysterectomy; Colonoscopy; vitrectomy (Right, 05/18/2022); and vitrectomy (Right, 5/18/2022).     CURRENT MEDICATIONS       Previous Medications    APOAEQUORIN (PREVAGEN PO)    Take 1 tablet by mouth daily    BIOTIN W/ VITAMINS C & E (HAIR SKIN & NAILS GUMMIES PO)    Take 6,000 mcg by mouth daily    CALCIUM CARBONATE (OSCAL) 500 MG TABS TABLET    Take 1,000 mg by mouth daily    CHOLECALCIFEROL (VITAMIN D3) 50 MCG (2000 UT) CAPS    Take 2,000 Units by mouth daily    COENZYME Q10 (CO Q-10) 100 MG CAPS    Take 100 mg by mouth daily    CYANOCOBALAMIN (VITAMIN B-12) 1000 MCG EXTENDED RELEASE TABLET    Take 2,500 mcg by mouth every other day    GARLIC PO    Take 115 mg by mouth daily    HYDROXYCHLOROQUINE (PLAQUENIL) 200 MG TABLET    Take 200 mg by mouth nightly    MULTIPLE VITAMINS-MINERALS (PRESERVISION AREDS 2 PO)    Take 1 tablet by mouth daily    MULTIPLE VITAMINS-MINERALS (THERAPEUTIC MULTIVITAMIN-MINERALS) TABLET    Take 1 tablet by mouth daily    QUERCETIN 250 MG TABS    Take 500 mg by mouth daily    RED YEAST RICE 600 MG TABS    Take 2,400 mg by mouth daily    TURMERIC 500 MG CAPS    Take 1,000 mg by mouth daily       ALLERGIES     is allergic to percocet [oxycodone-acetaminophen] and sulfa antibiotics.       Diagnostic Results         LABS:   Results for orders placed or performed during the hospital encounter of 11/16/22   CBC with Auto Differential   Result Value Ref Range    WBC 8.9 3.5 - 11.0 k/uL    RBC 4.51 4.0 - 5.2 m/uL    Hemoglobin 13.4 12.0 - 16.0 g/dL    Hematocrit 40.5 36 - 46 %    MCV 89.6 80 - 100 fL    MCH 29.6 26 - 34 pg    MCHC 33.0 31 - 37 g/dL    RDW 13.8 12.5 - 15.4 %    Platelets 907 869 - 093 k/uL    MPV 8.6 6.0 - 12.0 fL    Seg Neutrophils 78 (H) 36 - 66 %    Lymphocytes 17 (L) 24 - 44 %    Monocytes 4 2 - 11 %    Eosinophils % 1 1 - 4 %    Basophils 0 0 - 2 %    Segs Absolute 6.80 1.8 - 7.7 k/uL    Absolute Lymph # 1.50 1.0 - 4.8 k/uL    Absolute Mono # 0.40 0.1 - 1.2 k/uL    Absolute Eos # 0.10 0.0 - 0.4 k/uL    Basophils Absolute 0.00 0.0 - 0.2 k/uL   BMP   Result Value Ref Range    Glucose 160 (H) 70 - 99 mg/dL    BUN 15 8 - 23 mg/dL    Creatinine 0.73 0.50 - 0.90 mg/dL    Est, Glom Filt Rate >60 >60 mL/min/1.73m2    Calcium 11.2 (H) 8.6 - 10.4 mg/dL    Sodium 133 (L) 135 - 144 mmol/L    Potassium 4.3 3.7 - 5.3 mmol/L    Chloride 95 (L) 98 - 107 mmol/L    CO2 25 20 - 31 mmol/L    Anion Gap 13 9 - 17 mmol/L   Lipase   Result Value Ref Range    Lipase 30 13 - 60 U/L   Lactic Acid   Result Value Ref Range    Lactic Acid 2.2 0.5 - 2.2 mmol/L   Hepatic Function Panel   Result Value Ref Range    Albumin 5.0 3.5 - 5.2 g/dL    Alkaline Phosphatase 88 35 - 104 U/L    ALT 10 5 - 33 U/L    AST 19 <32 U/L    Total Bilirubin 0.6 0.3 - 1.2 mg/dL    Bilirubin, Direct 0.1 <0.31 mg/dL    Bilirubin, Indirect 0.5 0.00 - 1.00 mg/dL    Total Protein 7.8 6.4 - 8.3 g/dL    Albumin/Globulin Ratio 1.8 1.0 - 2.5   Urinalysis with Reflex to Culture    Specimen: Urine, clean catch   Result Value Ref Range    Color, UA Yellow Yellow    Turbidity UA Clear Clear    Glucose, Ur NEGATIVE NEGATIVE Bilirubin Urine NEGATIVE NEGATIVE    Ketones, Urine SMALL (A) NEGATIVE    Specific Gravity, UA 1.015 1.005 - 1.030    Urine Hgb NEGATIVE NEGATIVE    pH, UA 7.0 5.0 - 8.0    Protein, UA NEGATIVE NEGATIVE    Urobilinogen, Urine Normal Normal    Nitrite, Urine NEGATIVE NEGATIVE    Leukocyte Esterase, Urine TRACE (A) NEGATIVE   Microscopic Urinalysis   Result Value Ref Range    WBC, UA 0 TO 2 0 - 5 /HPF    RBC, UA None 0 - 2 /HPF    Epithelial Cells UA None 0 - 5 /HPF    Bacteria, UA None None    Other Observations UA (A) NOT REQ. Utilizing a urinalysis as the only screening method to exclude a potential uropathogen can be unreliable in many patient populations. Rapid screening tests are less sensitive than culture and if UTI is a clinical possibility, culture should be considered despite a negative urinalysis. RADIOLOGY:  CT ABDOMEN PELVIS W IV CONTRAST Additional Contrast? None   Final Result   1. Small sliding hiatal hernia. 2. No bowel obstruction however there is fluid-filled loops of ileum in the   lower pelvis which although not meeting criteria for abnormal distension is   of asymmetrically larger caliber than surrounding loops. This loop shows no   wall thickening or surrounding inflammatory changes. Uncertain significance. Please correlate clinically. If there are signs to suggest possible   developing bowel pathology then a follow-up may be obtained.              RECENT VITALS:  BP: 130/75, Temp: 98.1 °F (36.7 °C), Heart Rate: 72, Resp: 16     ED Course     The patient was given the following medications:  Orders Placed This Encounter   Medications    morphine injection 4 mg    ondansetron (ZOFRAN) injection 4 mg    0.9 % sodium chloride bolus    0.9 % sodium chloride bolus    iopamidol (ISOVUE-370) 76 % injection 75 mL    sodium chloride flush 0.9 % injection 10 mL    morphine injection 4 mg    ketorolac (TORADOL) injection 15 mg    HYDROcodone-acetaminophen (NORCO) 5-325 MG per tablet Sig: Take 1 tablet by mouth every 6 hours as needed for Pain for up to 3 days. Intended supply: 3 days. Take lowest dose possible to manage pain     Dispense:  6 tablet     Refill:  0       Medical Decision Making      ED Course as of 11/16/22 0853   Wed Nov 16, 2022   9423 Patient signed out to me pending results of CT scan as well as urinalysis and reevaluation [TS]   0841 Urinalysis is unremarkable [TS]   0842 CT scan showed evidence of possible developing small bowel obstruction without definitive findings of small bowel obstruction [TS]   0849 I discussed the results with the patient. I explained that I cannot definitively rule out a small bowel obstruction, however, she states that she is feeling better after receiving pain meds and Toradol in the emergency department. She is not nauseous and she is not vomiting. I explained that she should stick with clear fluids for the next 6 to 12 hours and then try foods if she is feeling better. She continues to have pain, nausea, vomiting, fever, any new or concerning symptoms in general she will need to return to the ER for reevaluation and further treatment. She did request pain medication and I told her that I was only willing to give a small amount because I do not definitively know the cause of her pain and I do not want to mask symptoms. She understands and is amenable to this. Her daughter who is a nurse is present with her and will also monitor and ensure that she returns if her condition worsens    At this time the patient is without objective evidence of an acute process requiring hospitalization or inpatient management. They have remained hemodynamically stable and are stable for discharge with outpatient follow-up. Standard anticipatory guidance given to patient upon discharge. Have given them a specific time frame in which to follow-up and who to follow-up with.   I have also advised them that they should return to the emergency department if they get worse, or not getting better or develop any new or concerning symptoms. Patient demonstrates understanding.   [TS]      ED Course User Index  [TS] Savanna Hernandez DO         EMERGENCY DEPARTMENT COURSE:   Vitals:    Vitals:    11/16/22 0622 11/16/22 0653 11/16/22 0819   BP: (!) 185/76 134/63 130/75   Pulse: 81  72   Resp: 18  16   Temp: 98.1 °F (36.7 °C)     SpO2: 98% 100% 97%   Weight: 64.4 kg (142 lb)     Height: 5' 3\" (1.6 m)       -------------------------  BP: 130/75, Temp: 98.1 °F (36.7 °C), Heart Rate: 72, Resp: 16      RE-EVALUATION:  Improved    PROCEDURES:  None    FINAL IMPRESSION      1. Abdominal pain, unspecified abdominal location          DISPOSITION/PLAN   DISPOSITION Decision To Discharge 11/16/2022 08:50:47 AM      CONDITION ON DISPOSITION:   Stable    PATIENT REFERRED TO:  DANIEL Ram - CNP  [address]  [phone]    In 1 day        DISCHARGE MEDICATIONS:  New Prescriptions    HYDROCODONE-ACETAMINOPHEN (NORCO) 5-325 MG PER TABLET    Take 1 tablet by mouth every 6 hours as needed for Pain for up to 3 days. Intended supply: 3 days.  Take lowest dose possible to manage pain       (Please note that portions of this note were completed with a voicerecognition program.  Efforts were made to edit the dictations but occasionally words are mis-transcribed.)    Savanna Hernandez DO  Attending Emergency Medicine Physician                      Savanna Hernandez DO  11/16/22 5801

## 2022-11-16 NOTE — ED NOTES
Pt to ER with family, ambulated to room, steady gait. Pt reports right back pain that is radiating to front abd, started this morning about 0200. Pt denies nausea/ emesis/ diarrhea. Pt reports pain 10/10, denies analgesics PTA. Pt reports h/o diverticulitis, unsure if symptoms are related.  Pt appears very uncomfortable, holding back, but remains calm, cooperative, respers even non labored, skin warm pink, no immediate distress, here for eval.      oJao Laurent RN  11/16/22 0540

## 2022-11-16 NOTE — ED PROVIDER NOTES
[facility] Emergency Department  [address] [address] [facility] [address] [facility] [address]  Phone: [phone]  Fax: [phone]      Pt Name: Carlitos Pearson  AXK:[id number]  [place] 1940  Date of evaluation: 11/16/2022      CHIEF COMPLAINT       Chief Complaint   Patient presents with    Abdominal Pain    Back Pain       HISTORY OF PRESENT ILLNESS   Carlitos Pearson is a [de-identified] y.o. female with history of diverticulitis, hysterectomy and rheumatoid arthritis who presents for evaluation of abdominal pain. The patient reports that starting at 2 AM she developed gradual onset, constant, progressive, sharp, stabbing, right upper quadrant and epigastric abdominal pain that radiates into her right flank. She took 3 aspirin for her pain around 2:30 AM without any improvement. The patient denies any associated nausea or vomiting. She is still passing flatus and last had a bowel movement just prior to arrival that appeared normal without any hematochezia or melena. The patient states that the location of her pain is different than when she has had diverticulitis in the past but the intensity of the pain feels similar. She denies any history of kidney stones. The patient still has her gallbladder and appendix. She states that she has been eating well over the past few weeks and last ate cauliflower soup for dinner. The patient does not list any provoking or palliating factors for her symptoms. She reports that she has been laying in bed with her  for the past several days who was in hospice and actively dying. She initially thought her pain was secondary to a muscle spasm from laying in bed. The patient also reports that she had COVID a few weeks ago but tested negative 3 days ago. The patient reports that she has history of RA but it has been well controlled for years. She denies any alcohol use.   She denies fever, chills, headache, vision changes, neck pain, back injury, urinary/bowel symptoms, vaginal bleeding, vaginal discharge, abdominal trauma, recent injury or falls. REVIEW OF SYSTEMS     Positive abdominal pain  Ten point review of systems was reviewed and is negative unless otherwise noted in the HPI    [address]    has a past medical history of Arthritis, Asthma, History of cardiac murmur, History of COVID-19, Hyperlipidemia, Macular pucker, right, Under care of team, Wears glasses, and Wellness examination. SURGICAL HISTORY      has a past surgical history that includes Tonsillectomy; Bladder surgery; Cataract removal with implant (Bilateral); Hysterectomy; Colonoscopy; vitrectomy (Right, 2022); and vitrectomy (Right, 2022). CURRENT MEDICATIONS       Previous Medications    APOAEQUORIN (PREVAGEN PO)    Take 1 tablet by mouth daily    BIOTIN W/ VITAMINS C & E (HAIR SKIN & NAILS GUMMIES PO)    Take 6,000 mcg by mouth daily    CALCIUM CARBONATE (OSCAL) 500 MG TABS TABLET    Take 1,000 mg by mouth daily    CHOLECALCIFEROL (VITAMIN D3) 50 MCG (2000 UT) CAPS    Take 2,000 Units by mouth daily    COENZYME Q10 (CO Q-10) 100 MG CAPS    Take 100 mg by mouth daily    CYANOCOBALAMIN (VITAMIN B-12) 1000 MCG EXTENDED RELEASE TABLET    Take 2,500 mcg by mouth every other day    GARLIC PO    Take 016 mg by mouth daily    HYDROXYCHLOROQUINE (PLAQUENIL) 200 MG TABLET    Take 200 mg by mouth nightly    MULTIPLE VITAMINS-MINERALS (PRESERVISION AREDS 2 PO)    Take 1 tablet by mouth daily    MULTIPLE VITAMINS-MINERALS (THERAPEUTIC MULTIVITAMIN-MINERALS) TABLET    Take 1 tablet by mouth daily    QUERCETIN 250 MG TABS    Take 500 mg by mouth daily    RED YEAST RICE 600 MG TABS    Take 2,400 mg by mouth daily    TURMERIC 500 MG CAPS    Take 1,000 mg by mouth daily       ALLERGIES     is allergic to percocet [oxycodone-acetaminophen] and sulfa antibiotics. FAMILY HISTORY     She indicated that her mother is . She indicated that her father is .      family history includes Cancer in her mother; Heart Disease in her mother; High Blood Pressure in her mother. SOCIAL HISTORY      reports that she has never smoked. She has never used smokeless tobacco. She reports that she does not drink alcohol and does not use drugs. PHYSICAL EXAM     INITIAL VITALS:  height is 5' 3\" (1.6 m) and weight is 64.4 kg (142 lb). Her temperature is 98.1 °F (36.7 °C). Her blood pressure is 134/63 and her pulse is 81. Her respiration is 18 and oxygen saturation is 100%. CONSTITUTIONAL: no apparent distress, well appearing  SKIN: warm, dry, no jaundice, hives or petechiae  EYES: clear conjunctiva, non-icteric sclera  HENT: normocephalic, atraumatic, moist mucus membranes  NECK: Nontender and supple with no nuchal rigidity, full range of motion  PULMONARY: clear to auscultation without wheezes, rhonchi, or rales, normal excursion, no accessory muscle use and no stridor  CARDIOVASCULAR: regular rate, rhythm. Strong radial pulses with intact distal perfusion. Capillary refill <2 seconds. GASTROINTESTINAL: soft, right upper quadrant and epigastric tenderness to palpation with tenderness to palpation over the right flank, no pain over McBurney's point, positive Holland sign, no pulsatile mass, non-distended, no palpable masses, no rebound or guarding   GENITOURINARY: No costovertebral angle tenderness to palpation  MUSCULOSKELETAL: No midline spinal tenderness, step off or deformity. Extremities are otherwise nontender to palpation and nonerythematous. Compartments soft. No peripheral edema. NEUROLOGIC: alert and oriented x 3, GCS 15, normal mentation and speech.  Moves all extremities x 4 without motor or sensory deficit, gait is stable without ataxia  PSYCHIATRIC: normal mood and affect, thought process is clear and linear    DIAGNOSTIC RESULTS     EKG:  None    RADIOLOGY:   pending    LABS:  Results for orders placed or performed during the hospital encounter of 11/16/22   CBC with Auto Differential Result Value Ref Range    WBC 8.9 3.5 - 11.0 k/uL    RBC 4.51 4.0 - 5.2 m/uL    Hemoglobin 13.4 12.0 - 16.0 g/dL    Hematocrit 40.5 36 - 46 %    MCV 89.6 80 - 100 fL    MCH 29.6 26 - 34 pg    MCHC 33.0 31 - 37 g/dL    RDW 13.8 12.5 - 15.4 %    Platelets 442 591 - 603 k/uL    MPV 8.6 6.0 - 12.0 fL    Seg Neutrophils 78 (H) 36 - 66 %    Lymphocytes 17 (L) 24 - 44 %    Monocytes 4 2 - 11 %    Eosinophils % 1 1 - 4 %    Basophils 0 0 - 2 %    Segs Absolute 6.80 1.8 - 7.7 k/uL    Absolute Lymph # 1.50 1.0 - 4.8 k/uL    Absolute Mono # 0.40 0.1 - 1.2 k/uL    Absolute Eos # 0.10 0.0 - 0.4 k/uL    Basophils Absolute 0.00 0.0 - 0.2 k/uL   BMP   Result Value Ref Range    Glucose 160 (H) 70 - 99 mg/dL    BUN 15 8 - 23 mg/dL    Creatinine 0.73 0.50 - 0.90 mg/dL    Est, Glom Filt Rate >60 >60 mL/min/1.73m2    Calcium 11.2 (H) 8.6 - 10.4 mg/dL    Sodium 133 (L) 135 - 144 mmol/L    Potassium 4.3 3.7 - 5.3 mmol/L    Chloride 95 (L) 98 - 107 mmol/L    CO2 25 20 - 31 mmol/L    Anion Gap 13 9 - 17 mmol/L   Lipase   Result Value Ref Range    Lipase 30 13 - 60 U/L   Lactic Acid   Result Value Ref Range    Lactic Acid 2.2 0.5 - 2.2 mmol/L   Hepatic Function Panel   Result Value Ref Range    Albumin 5.0 3.5 - 5.2 g/dL    Alkaline Phosphatase 88 35 - 104 U/L    ALT 10 5 - 33 U/L    AST 19 <32 U/L    Total Bilirubin 0.6 0.3 - 1.2 mg/dL    Bilirubin, Direct 0.1 <0.31 mg/dL    Bilirubin, Indirect 0.5 0.00 - 1.00 mg/dL    Total Protein 7.8 6.4 - 8.3 g/dL    Albumin/Globulin Ratio 1.8 1.0 - 2.5       EMERGENCY DEPARTMENT COURSE:        The patient was given the following medications:  Orders Placed This Encounter   Medications    morphine injection 4 mg    ondansetron (ZOFRAN) injection 4 mg    0.9 % sodium chloride bolus    0.9 % sodium chloride bolus    iopamidol (ISOVUE-370) 76 % injection 75 mL    sodium chloride flush 0.9 % injection 10 mL    morphine injection 4 mg        Vitals:    Vitals:    11/16/22 0622 11/16/22 0653   BP: (!) 185/76 134/63   Pulse: 81    Resp: 18    Temp: 98.1 °F (36.7 °C)    SpO2: 98% 100%   Weight: 64.4 kg (142 lb)    Height: 5' 3\" (1.6 m)      -------------------------  BP: 134/63, Temp: 98.1 °F (36.7 °C), Heart Rate: 81, Resp: 18    CONSULTS:  None    CRITICAL CARE:   None    PROCEDURES:  None    DIAGNOSIS/ MDM:   Fanny Espinal is a [de-identified] y.o. female who presents with abdominal pain. Vital signs are stable. She has tenderness palpation of the right upper quadrant and epigastrium with radiation into the right flank. CBC, BMP, lipase, lactic acid, LFTs are normal.  Her pain did improve with morphine, Zofran and IV fluids. Urinalysis and CT abdomen pelvis are pending. The patient was signed out to Dr. Velasco. FINAL IMPRESSION    No diagnosis found.   pending    DISPOSITION/PLAN   DISPOSITION  pending              (Please note that portions of this note were completed with a voice recognitionprogram.  Efforts were made to edit the dictations but occasionally words are mis-transcribed.)    Julian Pizarro DO, [address] [facility]  Emergency Physician Attending          Julian Pizarro DO  11/16/22 4979

## 2023-11-01 ENCOUNTER — HOSPITAL ENCOUNTER (OUTPATIENT)
Dept: PHYSICAL THERAPY | Facility: CLINIC | Age: 83
Setting detail: THERAPIES SERIES
Discharge: HOME | End: 2023-11-01
Payer: MEDICARE

## 2023-11-01 PROCEDURE — 97161 PT EVAL LOW COMPLEX 20 MIN: CPT

## 2023-11-09 ENCOUNTER — HOSPITAL ENCOUNTER (OUTPATIENT)
Dept: PHYSICAL THERAPY | Facility: CLINIC | Age: 83
Setting detail: THERAPIES SERIES
Discharge: HOME | End: 2023-11-09
Payer: MEDICARE

## 2023-11-09 PROCEDURE — 97140 MANUAL THERAPY 1/> REGIONS: CPT

## 2023-11-09 PROCEDURE — 97110 THERAPEUTIC EXERCISES: CPT

## 2023-11-09 NOTE — FLOWSHEET NOTE
Precautions:  Exercises:  Exercise Reps/ Time Weight/ Level Comments                                                                                                                           Other:      Treatment Charges: Mins Units   []  Modalities     []  Ther Exercise     []  Manual Therapy     []  Ther Activities     []  Neuro Re-ed     []  Vasocompression     [] Gait     []  Other     Total Billable time ***        Assessment: [] Progressing toward goals. [] No change. [] Other:  [] Patient would continue to benefit from skilled physical therapy services in order to: ***    STG/LTG    Pt. Education:  [] Yes  [] No  [] Reviewed Prior HEP/Ed  Method of Education: [] Verbal  [] Demo  [] Written  Comprehension of Education:  [] Verbalizes understanding. [] Demonstrates understanding. [] Needs review. [] Demonstrates/verbalizes HEP/Ed previously given. Plan: [] Continue current frequency toward long and short term goals.     [] Specific Instructions for subsequent treatments: ***      Time In:***            Time Out: ***    Electronically signed by:  Tadeo Allen, PT

## 2023-11-14 ENCOUNTER — HOSPITAL ENCOUNTER (OUTPATIENT)
Dept: PHYSICAL THERAPY | Facility: CLINIC | Age: 83
Setting detail: THERAPIES SERIES
Discharge: HOME | End: 2023-11-14
Payer: MEDICARE

## 2023-11-14 PROCEDURE — 97110 THERAPEUTIC EXERCISES: CPT

## 2023-11-14 PROCEDURE — 97140 MANUAL THERAPY 1/> REGIONS: CPT

## 2023-11-14 NOTE — FLOWSHEET NOTE
[] [address]  [address] [facility].  [phone]  F: [phone] [] [address] [facility]  [address]   [address]  P: [phone]  F: [phone] [x] [address]  [address] [facility]  P: [phone]  F: [phone] [] [place]: [phone]  F: [phone] [] [address] [facility]  [address] [facility]   [address]   P: [phone]  F: [phone]  [] [address] [facility].   P: [phone]  F: [phone] [] [address] [facility]  [address] [facility]   [address]  P: [phone]  F: [phone] [] [address] [facility]  [address] [facility]  [place]: [phone]  F: [phone] [] [address] [facility] [address]  [place]: [phone]  F: [phone]      Physical Therapy Daily Treatment Note    Date:  2023  Patient Name:  Vale Lopez    :  1940  MRN: [id number]  Physician: Henry Seaman DO                               Insurance: [facility] visits valid from 23-24 (6 visits) Auth# [id number]    Medical Diagnosis: M65.9 (ICD-10-CM) - Synovitis and tenosynovitis, unspecified  Rehab Codes: F86.305, M25.532, M62.81  Onset Date: 23     Next 's appt: tbd  Visit# / total visits: 3/6      Cancels/No Shows: 0/0      Subjective:    Pain:  [x] Yes  [] No Location: R wrist Pain Rating: (0-10 scale) 4/10  Pain altered Tx:  [] No  [] Yes  Action:  Comments: Patient reports feeling a lot better and has increased ROM in L wrist      Objective:  Modalities: STR APL,

## 2023-11-27 ENCOUNTER — APPOINTMENT (OUTPATIENT)
Dept: PHYSICAL THERAPY | Facility: CLINIC | Age: 83
End: 2023-11-27
Payer: MEDICARE

## 2023-12-06 ENCOUNTER — HOSPITAL ENCOUNTER (OUTPATIENT)
Dept: PHYSICAL THERAPY | Facility: CLINIC | Age: 83
Setting detail: THERAPIES SERIES
Discharge: HOME | End: 2023-12-06
Payer: MEDICARE

## 2023-12-06 PROCEDURE — 97140 MANUAL THERAPY 1/> REGIONS: CPT

## 2023-12-06 PROCEDURE — 97110 THERAPEUTIC EXERCISES: CPT

## 2023-12-06 NOTE — FLOWSHEET NOTE
term goals.           Time In:9:30am            Time Out: 10:30am    Electronically signed by:  Queenie Sharma PT

## 2023-12-12 ENCOUNTER — APPOINTMENT (OUTPATIENT)
Dept: PHYSICAL THERAPY | Facility: CLINIC | Age: 83
End: 2023-12-12
Payer: MEDICARE

## 2023-12-14 ENCOUNTER — HOSPITAL ENCOUNTER (OUTPATIENT)
Dept: PHYSICAL THERAPY | Facility: CLINIC | Age: 83
Setting detail: THERAPIES SERIES
Discharge: HOME | End: 2023-12-14
Payer: MEDICARE

## 2023-12-14 PROCEDURE — 97110 THERAPEUTIC EXERCISES: CPT

## 2023-12-14 NOTE — FLOWSHEET NOTE
Verbal  [] Demo  [] Written  Comprehension of Education:  [] Verbalizes understanding. [] Demonstrates understanding. [] Needs review. [] Demonstrates/verbalizes HEP/Ed previously given.      Plan: [x] Patient to continue with HEP and return to therapy PRN        Time In:1230pm           Time Out: 130pm    Electronically signed by:  Elaine Trejo PT

## 2023-12-19 ENCOUNTER — HOSPITAL ENCOUNTER (OUTPATIENT)
Dept: PHYSICAL THERAPY | Facility: CLINIC | Age: 83
Setting detail: THERAPIES SERIES
End: 2023-12-19
Payer: MEDICARE

## 2024-01-18 ENCOUNTER — CLINICAL DOCUMENTATION (OUTPATIENT)
Dept: PHYSICAL THERAPY | Facility: CLINIC | Age: 84
End: 2024-01-18

## 2024-01-18 NOTE — FLOWSHEET NOTE
discontinued per our policy.    [] Pt has completed prescribed number of treatment sessions.    [] Other:         Electronically signed by Angi Dee PTA on 1/18/2024 at 2:47 PM      If you have any questions or concerns, please don't hesitate to call.  Thank you for your referral.

## 2024-07-10 ENCOUNTER — OFFICE VISIT (OUTPATIENT)
Dept: FAMILY MEDICINE CLINIC | Age: 84
End: 2024-07-10
Payer: MEDICARE

## 2024-07-10 VITALS
WEIGHT: 139.2 LBS | TEMPERATURE: 98.1 F | SYSTOLIC BLOOD PRESSURE: 120 MMHG | BODY MASS INDEX: 24.66 KG/M2 | HEIGHT: 63 IN | HEART RATE: 75 BPM | DIASTOLIC BLOOD PRESSURE: 62 MMHG | RESPIRATION RATE: 16 BRPM | OXYGEN SATURATION: 93 %

## 2024-07-10 DIAGNOSIS — R30.0 BURNING WITH URINATION: Primary | ICD-10-CM

## 2024-07-10 PROBLEM — M25.511 ACUTE PAIN OF BOTH SHOULDERS: Status: ACTIVE | Noted: 2019-07-25

## 2024-07-10 PROBLEM — M06.9 RHEUMATOID ARTHRITIS (HCC): Status: ACTIVE | Noted: 2020-02-24

## 2024-07-10 PROBLEM — M25.512 ACUTE PAIN OF BOTH SHOULDERS: Status: ACTIVE | Noted: 2019-07-25

## 2024-07-10 LAB
BILIRUBIN, POC: NORMAL
BLOOD URINE, POC: NORMAL
CLARITY, POC: CLEAR
COLOR, POC: YELLOW
GLUCOSE URINE, POC: NORMAL
KETONES, POC: NORMAL
LEUKOCYTE EST, POC: NORMAL
NITRITE, POC: NORMAL
PH, POC: 6.5
PROTEIN, POC: NORMAL
SPECIFIC GRAVITY, POC: 1.02
UROBILINOGEN, POC: 0.2

## 2024-07-10 PROCEDURE — 3078F DIAST BP <80 MM HG: CPT | Performed by: NURSE PRACTITIONER

## 2024-07-10 PROCEDURE — 1123F ACP DISCUSS/DSCN MKR DOCD: CPT | Performed by: NURSE PRACTITIONER

## 2024-07-10 PROCEDURE — 3074F SYST BP LT 130 MM HG: CPT | Performed by: NURSE PRACTITIONER

## 2024-07-10 PROCEDURE — 81002 URINALYSIS NONAUTO W/O SCOPE: CPT | Performed by: NURSE PRACTITIONER

## 2024-07-10 PROCEDURE — 99213 OFFICE O/P EST LOW 20 MIN: CPT | Performed by: NURSE PRACTITIONER

## 2024-07-10 RX ORDER — CEPHALEXIN 500 MG/1
500 CAPSULE ORAL 3 TIMES DAILY
Qty: 21 CAPSULE | Refills: 0 | Status: SHIPPED | OUTPATIENT
Start: 2024-07-10 | End: 2024-07-17

## 2024-07-10 SDOH — ECONOMIC STABILITY: FOOD INSECURITY: WITHIN THE PAST 12 MONTHS, THE FOOD YOU BOUGHT JUST DIDN'T LAST AND YOU DIDN'T HAVE MONEY TO GET MORE.: NEVER TRUE

## 2024-07-10 SDOH — ECONOMIC STABILITY: FOOD INSECURITY: WITHIN THE PAST 12 MONTHS, YOU WORRIED THAT YOUR FOOD WOULD RUN OUT BEFORE YOU GOT MONEY TO BUY MORE.: NEVER TRUE

## 2024-07-10 SDOH — ECONOMIC STABILITY: HOUSING INSECURITY
IN THE LAST 12 MONTHS, WAS THERE A TIME WHEN YOU DID NOT HAVE A STEADY PLACE TO SLEEP OR SLEPT IN A SHELTER (INCLUDING NOW)?: NO

## 2024-07-10 SDOH — ECONOMIC STABILITY: INCOME INSECURITY: HOW HARD IS IT FOR YOU TO PAY FOR THE VERY BASICS LIKE FOOD, HOUSING, MEDICAL CARE, AND HEATING?: NOT HARD AT ALL

## 2024-07-10 ASSESSMENT — ENCOUNTER SYMPTOMS
NAUSEA: 0
VOMITING: 0
WHEEZING: 0
ABDOMINAL PAIN: 0
SHORTNESS OF BREATH: 0

## 2024-07-10 ASSESSMENT — PATIENT HEALTH QUESTIONNAIRE - PHQ9
SUM OF ALL RESPONSES TO PHQ QUESTIONS 1-9: 0
SUM OF ALL RESPONSES TO PHQ9 QUESTIONS 1 & 2: 0
1. LITTLE INTEREST OR PLEASURE IN DOING THINGS: NOT AT ALL
2. FEELING DOWN, DEPRESSED OR HOPELESS: NOT AT ALL
SUM OF ALL RESPONSES TO PHQ QUESTIONS 1-9: 0

## 2024-07-10 NOTE — PROGRESS NOTES
[facility]  [address]  [address]  [place] [address]    Mohini Farris is a 83 y.o. female who presents today for her medical conditions/complaints of Urinary Tract Infection (Burning with urination x 3 days, has used increased water and cranberry capsules)          HPI:     /62 (Site: Left Upper Arm, Position: Sitting, Cuff Size: Medium Adult)   Pulse 75   Temp 98.1 °F (36.7 °C) (Temporal)   Resp 16   Ht 1.6 m (5' 3\")   Wt 63.1 kg (139 lb 3.2 oz)   SpO2 93%   BMI 24.66 kg/m²       Urinary Frequency   This is a new problem. The current episode started within the past week. The problem occurs every urination. The problem has been unchanged. The quality of the pain is described as burning. The pain is moderate. There has been no fever. Associated symptoms include frequency, hesitancy and urgency. Pertinent negatives include no chills, discharge, flank pain, nausea, possible pregnancy, sweats or vomiting. She has tried increased fluids for the symptoms. The treatment provided no relief.      Past Medical History:   Diagnosis Date    Arthritis     rheumatoid, osteoarthritis, PMR    Asthma     no longer uses inhalers    History of cardiac murmur     pt reports initially heard in her 40's but that it hasn't been heard recently    History of COVID-19 11/2019    not hospitalized    Hyperlipidemia     Macular pucker, right     Under care of team     Rheumatology Dr. Ness/ [facility]/ last seen 5-2022    Wears glasses     Wellness examination     PCP Cat Flores CNP/ [place]/ last seen 5-2022        Past Surgical History:   Procedure Laterality Date    BLADDER SURGERY      Lift X 2    CATARACT REMOVAL WITH IMPLANT Bilateral     COLONOSCOPY      multiple    HYSTERECTOMY (CERVIX STATUS UNKNOWN)      TONSILLECTOMY      VITRECTOMY Right 05/18/2022    VITRECTOMY Right 5/18/2022    PARS PLANA VITRECTOMY 25 GAUGE, MEMBRANE PEEL, AIR FLUID EXCHANGE, ICG performed by

## 2024-08-05 ENCOUNTER — OFFICE VISIT (OUTPATIENT)
Dept: FAMILY MEDICINE CLINIC | Age: 84
End: 2024-08-05
Payer: MEDICARE

## 2024-08-05 VITALS
HEART RATE: 61 BPM | OXYGEN SATURATION: 95 % | TEMPERATURE: 97 F | DIASTOLIC BLOOD PRESSURE: 60 MMHG | SYSTOLIC BLOOD PRESSURE: 110 MMHG | RESPIRATION RATE: 14 BRPM

## 2024-08-05 DIAGNOSIS — R30.0 DYSURIA: ICD-10-CM

## 2024-08-05 DIAGNOSIS — N30.01 ACUTE CYSTITIS WITH HEMATURIA: Primary | ICD-10-CM

## 2024-08-05 LAB
APPEARANCE FLUID: ABNORMAL
BILIRUBIN, POC: ABNORMAL
BLOOD URINE, POC: ABNORMAL
CLARITY, POC: ABNORMAL
COLOR, POC: ABNORMAL
GLUCOSE URINE, POC: ABNORMAL
KETONES, POC: ABNORMAL
LEUKOCYTE EST, POC: ABNORMAL
NITRITE, POC: ABNORMAL
PH, POC: 6
PROTEIN, POC: 30
SPECIFIC GRAVITY, POC: >=1.03
UROBILINOGEN, POC: 0.2

## 2024-08-05 PROCEDURE — 1123F ACP DISCUSS/DSCN MKR DOCD: CPT | Performed by: NURSE PRACTITIONER

## 2024-08-05 PROCEDURE — 81002 URINALYSIS NONAUTO W/O SCOPE: CPT | Performed by: NURSE PRACTITIONER

## 2024-08-05 PROCEDURE — 3074F SYST BP LT 130 MM HG: CPT | Performed by: NURSE PRACTITIONER

## 2024-08-05 PROCEDURE — 99213 OFFICE O/P EST LOW 20 MIN: CPT | Performed by: NURSE PRACTITIONER

## 2024-08-05 PROCEDURE — 3078F DIAST BP <80 MM HG: CPT | Performed by: NURSE PRACTITIONER

## 2024-08-05 RX ORDER — CEPHALEXIN 500 MG/1
500 CAPSULE ORAL 2 TIMES DAILY
Qty: 20 CAPSULE | Refills: 1 | Status: SHIPPED | OUTPATIENT
Start: 2024-08-05 | End: 2024-08-15

## 2024-08-05 ASSESSMENT — ENCOUNTER SYMPTOMS
ABDOMINAL PAIN: 0
NAUSEA: 0
BACK PAIN: 0
SHORTNESS OF BREATH: 1
VOMITING: 0

## 2024-08-05 NOTE — PROGRESS NOTES
Family History   Problem Relation Age of Onset    Cancer Mother     Heart Disease Mother     High Blood Pressure Mother        Social History     Tobacco Use    Smoking status: Never    Smokeless tobacco: Never   Substance Use Topics    Alcohol use: Never        Prior to Visit Medications    Medication Sig Taking? Authorizing Provider   cephALEXin (KEFLEX) 500 MG capsule Take 1 capsule by mouth 2 times daily for 10 days Yes Faby Pelletier APRN - CNP   ondansetron (ZOFRAN ODT) 4 MG disintegrating tablet Take 1 tablet by mouth every 8 hours as needed for Nausea Yes Anamaria Smiley DO   dicyclomine (BENTYL) 10 MG capsule Take 1 capsule by mouth every 6 hours as needed (cramps) Yes Anamaria Smiley DO   hydroxychloroquine (PLAQUENIL) 200 MG tablet Take 1 tablet by mouth nightly Yes Avel Multani MD   Cyanocobalamin (VITAMIN B-12) 1000 MCG extended release tablet Take 2,500 mcg by mouth every other day Yes Avel Multani MD   Apoaequorin (PREVAGEN PO) Take 1 tablet by mouth daily Yes Avel Multani MD   GARLIC PO Take 600 mg by mouth daily Yes Avel Multani MD   Cholecalciferol (VITAMIN D3) 50 MCG (2000 UT) CAPS Take 1 capsule by mouth daily Yes Avel Multani MD   calcium carbonate (OSCAL) 500 MG TABS tablet Take 2 tablets by mouth daily Yes Avel Multani MD   Red Yeast Rice 600 MG TABS Take 2,400 mg by mouth daily Yes Avel Multani MD   Multiple Vitamins-Minerals (PRESERVISION AREDS 2 PO) Take 1 tablet by mouth daily Yes Avel Multani MD   Biotin w/ Vitamins C & E (HAIR SKIN & NAILS GUMMIES PO) Take 6,000 mcg by mouth daily Yes Avel Multani MD   Multiple Vitamins-Minerals (THERAPEUTIC MULTIVITAMIN-MINERALS) tablet Take 1 tablet by mouth daily Yes Avel Multani MD   Quercetin 250 MG TABS Take 500 mg by mouth daily Yes Avel Multani MD   turmeric 500 MG CAPS Take 2 capsules by mouth daily Yes Avel Multani MD

## 2025-05-27 ENCOUNTER — HOSPITAL ENCOUNTER (OUTPATIENT)
Age: 85
Setting detail: SPECIMEN
Discharge: HOME | End: 2025-05-27

## 2025-05-27 ENCOUNTER — OFFICE VISIT (OUTPATIENT)
Dept: FAMILY MEDICINE CLINIC | Age: 85
End: 2025-05-27
Payer: MEDICARE

## 2025-05-27 VITALS
SYSTOLIC BLOOD PRESSURE: 118 MMHG | DIASTOLIC BLOOD PRESSURE: 68 MMHG | RESPIRATION RATE: 16 BRPM | TEMPERATURE: 97.8 F | WEIGHT: 145 LBS | OXYGEN SATURATION: 99 % | BODY MASS INDEX: 25.69 KG/M2 | HEART RATE: 57 BPM

## 2025-05-27 DIAGNOSIS — R35.0 URINARY FREQUENCY: ICD-10-CM

## 2025-05-27 DIAGNOSIS — R30.0 DYSURIA: ICD-10-CM

## 2025-05-27 DIAGNOSIS — R30.0 DYSURIA: Primary | ICD-10-CM

## 2025-05-27 PROCEDURE — 1159F MED LIST DOCD IN RCRD: CPT | Performed by: NURSE PRACTITIONER

## 2025-05-27 PROCEDURE — 1123F ACP DISCUSS/DSCN MKR DOCD: CPT | Performed by: NURSE PRACTITIONER

## 2025-05-27 PROCEDURE — 99213 OFFICE O/P EST LOW 20 MIN: CPT | Performed by: NURSE PRACTITIONER

## 2025-05-27 PROCEDURE — 3074F SYST BP LT 130 MM HG: CPT | Performed by: NURSE PRACTITIONER

## 2025-05-27 PROCEDURE — 3078F DIAST BP <80 MM HG: CPT | Performed by: NURSE PRACTITIONER

## 2025-05-27 PROCEDURE — 1160F RVW MEDS BY RX/DR IN RCRD: CPT | Performed by: NURSE PRACTITIONER

## 2025-05-27 RX ORDER — CEPHALEXIN 500 MG/1
500 CAPSULE ORAL 2 TIMES DAILY
Qty: 20 CAPSULE | Refills: 0 | Status: SHIPPED | OUTPATIENT
Start: 2025-05-27 | End: 2025-06-06

## 2025-05-27 NOTE — PROGRESS NOTES
daily  Provider, MD Avel   Coenzyme Q10 (CO Q-10) 100 MG CAPS Take 1 capsule by mouth daily  Provider, MD Avel       Allergies   Allergen Reactions    Percocet [Oxycodone-Acetaminophen] Hives    Sulfa Antibiotics Other (See Comments)     Unknown reaction as a child         Subjective:      Review of Systems  PER HPI    Objective:     Physical Exam  Vitals and nursing note reviewed.   Constitutional:       General: She is not in acute distress.     Appearance: Normal appearance.   HENT:      Head: Normocephalic and atraumatic.      Right Ear: Ear canal and external ear normal.      Left Ear: Ear canal and external ear normal.      Nose: Nose normal.      Mouth/Throat:      Lips: Pink.      Mouth: Mucous membranes are moist.      Pharynx: Uvula midline.   Eyes:      Extraocular Movements: Extraocular movements intact.   Cardiovascular:      Rate and Rhythm: Regular rhythm. Bradycardia present.   Abdominal:      General: There is no distension.      Palpations: Abdomen is soft.      Tenderness: There is no abdominal tenderness. There is no right CVA tenderness or left CVA tenderness.   Musculoskeletal:         General: Normal range of motion.      Cervical back: Normal range of motion and neck supple.   Skin:     General: Skin is warm and dry.   Neurological:      Mental Status: She is alert and oriented to person, place, and time.   Psychiatric:         Thought Content: Thought content normal.           MEDICAL DECISION MAKING Assessment/Plan:     Mohini was seen today for urinary frequency.    Diagnoses and all orders for this visit:    Dysuria  -     Cancel: POCT Urinalysis No Micro (Auto)  -     Culture, Urine; Future  -     cephALEXin (KEFLEX) 500 MG capsule; Take 1 capsule by mouth 2 times daily for 10 days    Urinary frequency  -     Culture, Urine; Future  -     cephALEXin (KEFLEX) 500 MG capsule; Take 1 capsule by mouth 2 times daily for 10 days        Results for orders placed or performed in

## 2025-05-28 ENCOUNTER — RESULTS FOLLOW-UP (OUTPATIENT)
Dept: FAMILY MEDICINE CLINIC | Age: 85
End: 2025-05-28

## 2025-05-28 LAB
MICROORGANISM SPEC CULT: ABNORMAL
SERVICE CMNT-IMP: ABNORMAL
SPECIMEN DESCRIPTION: ABNORMAL

## 2025-06-11 ENCOUNTER — OFFICE VISIT (OUTPATIENT)
Dept: FAMILY MEDICINE CLINIC | Age: 85
End: 2025-06-11
Payer: MEDICARE

## 2025-06-11 VITALS
BODY MASS INDEX: 25.34 KG/M2 | HEIGHT: 63 IN | WEIGHT: 143 LBS | SYSTOLIC BLOOD PRESSURE: 106 MMHG | HEART RATE: 71 BPM | OXYGEN SATURATION: 96 % | DIASTOLIC BLOOD PRESSURE: 68 MMHG

## 2025-06-11 DIAGNOSIS — R30.0 DYSURIA: Primary | ICD-10-CM

## 2025-06-11 LAB
BILIRUBIN, POC: NORMAL
BLOOD URINE, POC: NORMAL
CLARITY, POC: CLEAR
COLOR, POC: YELLOW
GLUCOSE URINE, POC: NORMAL MG/DL
KETONES, POC: NORMAL MG/DL
LEUKOCYTE EST, POC: NORMAL
NITRITE, POC: NORMAL
PH, POC: 6.5
PROTEIN, POC: NORMAL MG/DL
SPECIFIC GRAVITY, POC: 1.01
UROBILINOGEN, POC: 0.2 MG/DL

## 2025-06-11 PROCEDURE — 3078F DIAST BP <80 MM HG: CPT | Performed by: NURSE PRACTITIONER

## 2025-06-11 PROCEDURE — 81002 URINALYSIS NONAUTO W/O SCOPE: CPT | Performed by: NURSE PRACTITIONER

## 2025-06-11 PROCEDURE — 99213 OFFICE O/P EST LOW 20 MIN: CPT | Performed by: NURSE PRACTITIONER

## 2025-06-11 PROCEDURE — 1123F ACP DISCUSS/DSCN MKR DOCD: CPT | Performed by: NURSE PRACTITIONER

## 2025-06-11 PROCEDURE — 1160F RVW MEDS BY RX/DR IN RCRD: CPT | Performed by: NURSE PRACTITIONER

## 2025-06-11 PROCEDURE — 3074F SYST BP LT 130 MM HG: CPT | Performed by: NURSE PRACTITIONER

## 2025-06-11 PROCEDURE — 1159F MED LIST DOCD IN RCRD: CPT | Performed by: NURSE PRACTITIONER

## 2025-06-11 RX ORDER — NITROFURANTOIN 25; 75 MG/1; MG/1
100 CAPSULE ORAL 2 TIMES DAILY
Qty: 10 CAPSULE | Refills: 0 | Status: SHIPPED | OUTPATIENT
Start: 2025-06-11 | End: 2025-06-16

## 2025-06-11 SDOH — ECONOMIC STABILITY: FOOD INSECURITY: WITHIN THE PAST 12 MONTHS, YOU WORRIED THAT YOUR FOOD WOULD RUN OUT BEFORE YOU GOT MONEY TO BUY MORE.: NEVER TRUE

## 2025-06-11 SDOH — ECONOMIC STABILITY: FOOD INSECURITY: WITHIN THE PAST 12 MONTHS, THE FOOD YOU BOUGHT JUST DIDN'T LAST AND YOU DIDN'T HAVE MONEY TO GET MORE.: NEVER TRUE

## 2025-06-11 ASSESSMENT — PATIENT HEALTH QUESTIONNAIRE - PHQ9
SUM OF ALL RESPONSES TO PHQ QUESTIONS 1-9: 0
SUM OF ALL RESPONSES TO PHQ QUESTIONS 1-9: 0
2. FEELING DOWN, DEPRESSED OR HOPELESS: NOT AT ALL
SUM OF ALL RESPONSES TO PHQ QUESTIONS 1-9: 0
1. LITTLE INTEREST OR PLEASURE IN DOING THINGS: NOT AT ALL
SUM OF ALL RESPONSES TO PHQ QUESTIONS 1-9: 0

## 2025-06-11 NOTE — PROGRESS NOTES
[facility]YSIC[facility]IN  [address]  [address]  [place] [address]  Dept: [phone]     Mohini Farris is a 84 y.o. female Established patient, who presents to the walk-in clinic today with conditions/complaints as noted below:    Chief Complaint   Patient presents with    Urinary Pain     X1 day, mild burning sensation while urinating         HPI:     HPI  Pt presented to the walk in clinic today with c/o UTI symptoms. This is a new problem. The current episode started 1 days ago. Associated symptoms include: dysuria.  Pertinent negatives include: No fever, chills, n/v, abdominal pain, flank pain, urgency, frequency.  Pt has hx of recurrent UTI. Last UTI 2 weeks ago. Treated with keflex which resolved her symptoms. 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 Has appt with urologist in sept.     Past Medical History:   Diagnosis Date    Arthritis     rheumatoid, osteoarthritis, PMR    Asthma     no longer uses inhalers    History of cardiac murmur     pt reports initially heard in her 40's but that it hasn't been heard recently    History of COVID-19 11/2019    not hospitalized    Hyperlipidemia     Macular pucker, right     Under care of team     Rheumatology Dr. Ness/ [place]/ last seen 5-2022    Wears glasses     Wellness examination     PCP Cat Flores [facility]/ [place]/ last seen 5-2022       Current Outpatient Medications   Medication Sig Dispense Refill    nitrofurantoin, macrocrystal-monohydrate, (MACROBID) 100 MG capsule Take 1 capsule by mouth 2 times daily for 5 days 10 capsule 0    hydroxychloroquine (PLAQUENIL) 200 MG tablet Take 1 tablet by mouth nightly      ondansetron (ZOFRAN ODT) 4 MG disintegrating tablet Take 1 tablet by mouth every 8 hours as needed for Nausea (Patient not taking: Reported on 6/11/2025) 10 tablet 0    dicyclomine (BENTYL) 10 MG capsule Take 1 capsule by mouth every 6 hours as

## 2025-06-13 ENCOUNTER — TELEPHONE (OUTPATIENT)
Dept: FAMILY MEDICINE CLINIC | Age: 85
End: 2025-06-13

## 2025-06-13 ENCOUNTER — RESULTS FOLLOW-UP (OUTPATIENT)
Dept: FAMILY MEDICINE CLINIC | Age: 85
End: 2025-06-13

## 2025-06-13 DIAGNOSIS — N30.00 ACUTE CYSTITIS WITHOUT HEMATURIA: Primary | ICD-10-CM

## 2025-06-13 DIAGNOSIS — R30.0 DYSURIA: ICD-10-CM

## 2025-06-13 NOTE — TELEPHONE ENCOUNTER
Patient called into office stating she had a reaction to Macrobid prescribed on 6/11 for UTI. Patient reports that immediately after taking medication she had pain all over her body and a fever. Patient has taken two doses but has since discontinued use due to the reaction. Writer looked in patients chart, still no results for urine culture. Writer spoke with [facility] lab- they state they are sending results for culture over via fax. Patient states she was previously prescribed Keflex and did okay on that.

## 2025-06-13 NOTE — TELEPHONE ENCOUNTER
It looks like urine culture does confirm UTI. Ok to stop the macrobid. I will send in Augmentin which should give good coverage for the bacteria to treat it based on the culture results. Follow up if symptoms persist .

## 2025-07-17 ENCOUNTER — OFFICE VISIT (OUTPATIENT)
Dept: FAMILY MEDICINE CLINIC | Age: 85
End: 2025-07-17
Payer: MEDICARE

## 2025-07-17 VITALS
HEART RATE: 77 BPM | BODY MASS INDEX: 25.72 KG/M2 | OXYGEN SATURATION: 94 % | WEIGHT: 145.2 LBS | SYSTOLIC BLOOD PRESSURE: 122 MMHG | RESPIRATION RATE: 16 BRPM | DIASTOLIC BLOOD PRESSURE: 68 MMHG | TEMPERATURE: 98.6 F

## 2025-07-17 DIAGNOSIS — B35.9 TINEA: Primary | ICD-10-CM

## 2025-07-17 PROCEDURE — 1159F MED LIST DOCD IN RCRD: CPT

## 2025-07-17 PROCEDURE — 3074F SYST BP LT 130 MM HG: CPT

## 2025-07-17 PROCEDURE — 1123F ACP DISCUSS/DSCN MKR DOCD: CPT

## 2025-07-17 PROCEDURE — 3078F DIAST BP <80 MM HG: CPT

## 2025-07-17 PROCEDURE — 99213 OFFICE O/P EST LOW 20 MIN: CPT

## 2025-07-17 RX ORDER — CLOTRIMAZOLE AND BETAMETHASONE DIPROPIONATE 10; .64 MG/G; MG/G
CREAM TOPICAL
Qty: 45 G | Refills: 0 | Status: SHIPPED | OUTPATIENT
Start: 2025-07-17

## 2025-07-17 ASSESSMENT — ENCOUNTER SYMPTOMS
COUGH: 0
DIARRHEA: 0
EYE DISCHARGE: 0
SHORTNESS OF BREATH: 0
VOMITING: 0
COLOR CHANGE: 1
NAUSEA: 0
SORE THROAT: 0
CONSTIPATION: 0

## 2025-07-17 NOTE — PROGRESS NOTES
Formerly named [facility]ALK-IN  [address]  [facility] [id number]    Formerly named [facility]  [address]   [address]  [place] [facility] [address]  Dept: [phone]    Mohini Farris is a 84 y.o. female Established patient, who presents to the walk-in clinic today with conditions/complaints as noted below:    Chief Complaint   Patient presents with    Herpes Zoster     Itching lower back, left side, \"pin prickling in back\"- worse at night. Sx started several months ago. Used benadryl, OTC topical medication, no relief. Possible shingles.          HPI:     Patient presents with ongoing itching and \"pin prickling\" sensation on her back and lower abdomen. She notes no acute shingles flare up that she recalls. She has had a little rash on her low back, left under arm area, and lower abdomen. She has changed detergents to see I it would help, done extra rinse cycles.  Nothing seems to improve it. She feels like this started a couple of months ago.        Past Medical History:   Diagnosis Date    Arthritis     rheumatoid, osteoarthritis, PMR    Asthma     no longer uses inhalers    History of cardiac murmur     pt reports initially heard in her 40's but that it hasn't been heard recently    History of COVID-19 11/2019    not hospitalized    Hyperlipidemia     Macular pucker, right     Under care of team     Rheumatology Dr. Ness/ [facility]/ last seen 5-2022    Wears glasses     Wellness examination     PCP Cat Flores [facility]/ [place]/ last seen 5-2022       Current Outpatient Medications   Medication Sig Dispense Refill    clotrimazole-betamethasone (LOTRISONE) 1-0.05 % cream Apply topically 2 times daily. 45 g 0    hydroxychloroquine (PLAQUENIL) 200 MG tablet Take 1 tablet by mouth nightly      ondansetron (ZOFRAN ODT) 4 MG disintegrating tablet Take 1 tablet by mouth every 8 hours as

## 2025-09-04 ENCOUNTER — OFFICE VISIT (OUTPATIENT)
Dept: FAMILY MEDICINE CLINIC | Age: 85
End: 2025-09-04
Payer: MEDICARE

## 2025-09-04 ENCOUNTER — HOSPITAL ENCOUNTER (OUTPATIENT)
Age: 85
Setting detail: SPECIMEN
Discharge: HOME | End: 2025-09-04

## 2025-09-04 VITALS
DIASTOLIC BLOOD PRESSURE: 72 MMHG | OXYGEN SATURATION: 95 % | WEIGHT: 144 LBS | RESPIRATION RATE: 16 BRPM | TEMPERATURE: 98.2 F | HEART RATE: 69 BPM | SYSTOLIC BLOOD PRESSURE: 110 MMHG | BODY MASS INDEX: 25.51 KG/M2

## 2025-09-04 DIAGNOSIS — L29.9 GENERALIZED PRURITUS: Primary | ICD-10-CM

## 2025-09-04 DIAGNOSIS — N89.8 VAGINAL ITCHING: ICD-10-CM

## 2025-09-04 DIAGNOSIS — B35.9 TINEA: ICD-10-CM

## 2025-09-04 PROCEDURE — 99213 OFFICE O/P EST LOW 20 MIN: CPT | Performed by: NURSE PRACTITIONER

## 2025-09-04 PROCEDURE — 1123F ACP DISCUSS/DSCN MKR DOCD: CPT | Performed by: NURSE PRACTITIONER

## 2025-09-04 PROCEDURE — 1160F RVW MEDS BY RX/DR IN RCRD: CPT | Performed by: NURSE PRACTITIONER

## 2025-09-04 PROCEDURE — 3074F SYST BP LT 130 MM HG: CPT | Performed by: NURSE PRACTITIONER

## 2025-09-04 PROCEDURE — 3078F DIAST BP <80 MM HG: CPT | Performed by: NURSE PRACTITIONER

## 2025-09-04 PROCEDURE — 1159F MED LIST DOCD IN RCRD: CPT | Performed by: NURSE PRACTITIONER

## 2025-09-04 RX ORDER — ESTRADIOL 0.1 MG/G
CREAM VAGINAL
COMMUNITY
Start: 2025-09-03

## 2025-09-04 RX ORDER — CLOTRIMAZOLE AND BETAMETHASONE DIPROPIONATE 10; .64 MG/G; MG/G
CREAM TOPICAL
Qty: 45 G | Refills: 0 | Status: SHIPPED | OUTPATIENT
Start: 2025-09-04

## 2025-09-04 RX ORDER — FLUCONAZOLE 150 MG/1
150 TABLET ORAL ONCE
Qty: 1 TABLET | Refills: 0 | Status: SHIPPED | OUTPATIENT
Start: 2025-09-04 | End: 2025-09-04

## 2025-09-05 LAB
CANDIDA SPECIES: NEGATIVE
GARDNERELLA VAGINALIS: NEGATIVE
SOURCE: NORMAL
TRICHOMONAS: NEGATIVE

## (undated) DEVICE — REVOLUTION DSP 25G ILM FORCEPS: Brand: ALCON GRIESHABER REVOLUTION

## (undated) DEVICE — OCCLUDER EYE POLYETH HYPOALRG ADH TAPE W/O PINHOLE

## (undated) DEVICE — SOLUTION IRRIG 1000ML PENTALYTE PLAS POUR BTL TIS U SOL

## (undated) DEVICE — SYRINGE, LUER LOCK, 10ML: Brand: MEDLINE

## (undated) DEVICE — BLUNTFILL WITH FILTER: Brand: MONOJECT

## (undated) DEVICE — 25GA RETRACTABLE ILM MEMBRANE ELEVATOR BOX OF 5: Brand: VORTEX SURGICAL INC

## (undated) DEVICE — 25GA EZPASS SOFT TIP CANNULA BOX OF 5: Brand: VORTEX SURGICAL INC

## (undated) DEVICE — OCCUCOAT SYRINGE 1ML 6PK: Brand: OCCUCOAT

## (undated) DEVICE — DRESSING TRNSPAR W2XL2.75IN FLM SHT SEMIPERMEABLE WIND

## (undated) DEVICE — SYRINGE MED 5ML STD CLR PLAS LUERLOCK TIP N CTRL DISP

## (undated) DEVICE — RETINA PK

## (undated) DEVICE — SYRINGE MED 50ML LUERLOCK TIP

## (undated) DEVICE — SOLUTION PREP POVIDONE IOD FOR SKIN MUCOUS MEM PRIOR TO

## (undated) DEVICE — GLOVE ORANGE PI 7   MSG9070

## (undated) DEVICE — NEEDLE HYPO 30GA L0.5IN BGE POLYPR HUB S STL REG BVL STR

## (undated) DEVICE — 25 GA FLEXIBLE TIP LASER PROBE: Brand: ALCON, ENGAUGE

## (undated) DEVICE — SOLUTION SCRB 4OZ 10% POVIDONE IOD ANTIMIC BTL

## (undated) DEVICE — 1 EACH 40411 STERILE DISPOSABLE SUPER VIEW® LENS SET & 1 EACH 40100 STERILE MICROSCOPE DRAPE: Brand: SUPER VIEW® PACK

## (undated) DEVICE — STANDARD HYPODERMIC NEEDLE,ALUMINUM HUB: Brand: MONOJECT

## (undated) DEVICE — SURGICAL PROCEDURE PACK 25 GA VITRECTOMY

## (undated) DEVICE — 25+® FINESSE® FLEX LOOP DSP: Brand: ALCON GRIESHABER 25+ FINESSE